# Patient Record
Sex: MALE | URBAN - METROPOLITAN AREA
[De-identification: names, ages, dates, MRNs, and addresses within clinical notes are randomized per-mention and may not be internally consistent; named-entity substitution may affect disease eponyms.]

---

## 2017-02-17 ENCOUNTER — APPOINTMENT (OUTPATIENT)
Age: 72
Setting detail: DERMATOLOGY
End: 2017-02-20

## 2017-02-17 DIAGNOSIS — K6811 OTHER POSTOPERATIVE INFECTION: ICD-10-CM

## 2017-02-17 DIAGNOSIS — L82.1 OTHER SEBORRHEIC KERATOSIS: ICD-10-CM

## 2017-02-17 DIAGNOSIS — D18.0 HEMANGIOMA: ICD-10-CM

## 2017-02-17 DIAGNOSIS — T814XXA OTHER POSTOPERATIVE INFECTION: ICD-10-CM

## 2017-02-17 DIAGNOSIS — Z85.828 PERSONAL HISTORY OF OTHER MALIGNANT NEOPLASM OF SKIN: ICD-10-CM

## 2017-02-17 DIAGNOSIS — D22 MELANOCYTIC NEVI: ICD-10-CM

## 2017-02-17 PROBLEM — D22.5 MELANOCYTIC NEVI OF TRUNK: Status: ACTIVE | Noted: 2017-02-17

## 2017-02-17 PROBLEM — T81.4XXA INFECTION FOLLOWING A PROCEDURE, INITIAL ENCOUNTER: Status: ACTIVE | Noted: 2017-02-17

## 2017-02-17 PROBLEM — D48.5 NEOPLASM OF UNCERTAIN BEHAVIOR OF SKIN: Status: ACTIVE | Noted: 2017-02-17

## 2017-02-17 PROBLEM — D18.01 HEMANGIOMA OF SKIN AND SUBCUTANEOUS TISSUE: Status: ACTIVE | Noted: 2017-02-17

## 2017-02-17 PROCEDURE — 11100: CPT

## 2017-02-17 PROCEDURE — OTHER ORDER TESTS: OTHER

## 2017-02-17 PROCEDURE — OTHER BIOPSY BY SHAVE METHOD: OTHER

## 2017-02-17 PROCEDURE — OTHER COUNSELING: OTHER

## 2017-02-17 PROCEDURE — 99213 OFFICE O/P EST LOW 20 MIN: CPT | Mod: 25

## 2017-02-17 ASSESSMENT — LOCATION ZONE DERM
LOCATION ZONE: FACE
LOCATION ZONE: TRUNK
LOCATION ZONE: ARM
LOCATION ZONE: FINGER

## 2017-02-17 ASSESSMENT — LOCATION DETAILED DESCRIPTION DERM
LOCATION DETAILED: RIGHT SUPERIOR MEDIAL MIDBACK
LOCATION DETAILED: LEFT LATERAL MANDIBULAR CHEEK
LOCATION DETAILED: RIGHT MEDIAL UPPER BACK
LOCATION DETAILED: LEFT PROXIMAL POSTERIOR UPPER ARM
LOCATION DETAILED: RIGHT MID DORSAL RING FINGER
LOCATION DETAILED: LEFT MID-UPPER BACK
LOCATION DETAILED: RIGHT POSTERIOR SHOULDER
LOCATION DETAILED: RIGHT SUPERIOR MEDIAL UPPER BACK
LOCATION DETAILED: EPIGASTRIC SKIN

## 2017-02-17 ASSESSMENT — LOCATION SIMPLE DESCRIPTION DERM
LOCATION SIMPLE: RIGHT RING FINGER
LOCATION SIMPLE: LEFT CHEEK
LOCATION SIMPLE: LEFT UPPER BACK
LOCATION SIMPLE: RIGHT SHOULDER
LOCATION SIMPLE: RIGHT LOWER BACK
LOCATION SIMPLE: ABDOMEN
LOCATION SIMPLE: RIGHT UPPER BACK
LOCATION SIMPLE: LEFT UPPER ARM

## 2017-02-17 NOTE — HPI: SKIN LESIONS
How Severe Is Your Skin Lesion?: mild
Have Your Skin Lesions Been Treated?: not been treated
Is This A New Presentation, Or A Follow-Up?: Skin Lesions
Additional History: His wife noticed these lesions. He did not even know he had them. No pain, bleeding or irritation. History of basal cell carcinoma on left jaw and back several years ago.

## 2017-02-17 NOTE — PROCEDURE: BIOPSY BY SHAVE METHOD
Anesthesia Volume In Cc (Will Not Render If 0): 0.5
Size Of Lesion In Cm: 0
Electrodesiccation Text: The wound bed was treated with electrodesiccation after the biopsy was performed.
Body Location Override (Optional - Billing Will Still Be Based On Selected Body Map Location If Applicable): Left mandibular angle
Electrodesiccation And Curettage Text: The wound bed was treated with electrodesiccation and curettage after the biopsy was performed.
Silver Nitrate Text: The wound bed was treated with silver nitrate after the biopsy was performed.
Wound Care: Vaseline
Billing Type: Third-Party Bill
Bill For Surgical Tray: no
Anesthesia Type: 1% lidocaine with epinephrine and a 1:10 solution of 8.4% sodium bicarbonate
Type Of Destruction Used: Curettage
Consent: Written consent was obtained and risks were reviewed including but not limited to scarring, infection, bleeding, scabbing, incomplete removal, nerve damage and allergy to anesthesia.
Biopsy Type: H and E
Post-Care Instructions: I reviewed with the patient in detail post-care instructions. Patient is to keep the biopsy site dry overnight, and then apply bacitracin twice daily until healed. Patient may apply hydrogen peroxide soaks to remove any crusting.
Cryotherapy Text: The wound bed was treated with cryotherapy after the biopsy was performed.
Curettage Text: The wound bed was treated with curettage after the biopsy was performed.
Notification Instructions: Patient will be notified of biopsy results. However, patient instructed to call the office if not contacted within 2 weeks.
Biopsy Method: Double edge Personna blades
Detail Level: Detailed
Dressing: bandage
Hemostasis: Aluminum Chloride

## 2019-08-28 ENCOUNTER — APPOINTMENT (OUTPATIENT)
Age: 74
Setting detail: DERMATOLOGY
End: 2019-08-29

## 2019-08-28 DIAGNOSIS — L82.1 OTHER SEBORRHEIC KERATOSIS: ICD-10-CM

## 2019-08-28 DIAGNOSIS — D485 NEOPLASM OF UNCERTAIN BEHAVIOR OF SKIN: ICD-10-CM

## 2019-08-28 DIAGNOSIS — L40.0 PSORIASIS VULGARIS: ICD-10-CM

## 2019-08-28 DIAGNOSIS — Z71.89 OTHER SPECIFIED COUNSELING: ICD-10-CM

## 2019-08-28 DIAGNOSIS — L85.3 XEROSIS CUTIS: ICD-10-CM

## 2019-08-28 PROBLEM — D48.5 NEOPLASM OF UNCERTAIN BEHAVIOR OF SKIN: Status: ACTIVE | Noted: 2019-08-28

## 2019-08-28 PROCEDURE — OTHER BIOPSY BY SHAVE METHOD: OTHER

## 2019-08-28 PROCEDURE — OTHER PRESCRIPTION: OTHER

## 2019-08-28 PROCEDURE — OTHER MIPS QUALITY: OTHER

## 2019-08-28 PROCEDURE — OTHER COUNSELING: OTHER

## 2019-08-28 PROCEDURE — 11102 TANGNTL BX SKIN SINGLE LES: CPT

## 2019-08-28 PROCEDURE — 99213 OFFICE O/P EST LOW 20 MIN: CPT | Mod: 25

## 2019-08-28 RX ORDER — TRIAMCINOLONE ACETONIDE 1 MG/G
APPLY THIN COAT CREAM TOPICAL BID
Qty: 30 | Refills: 1 | Status: ERX | COMMUNITY
Start: 2019-08-28

## 2019-08-28 ASSESSMENT — LOCATION SIMPLE DESCRIPTION DERM
LOCATION SIMPLE: RIGHT UPPER BACK
LOCATION SIMPLE: RIGHT KNEE
LOCATION SIMPLE: LEFT CHEEK

## 2019-08-28 ASSESSMENT — BSA PSORIASIS: % BODY COVERED IN PSORIASIS: 1

## 2019-08-28 ASSESSMENT — LOCATION ZONE DERM
LOCATION ZONE: FACE
LOCATION ZONE: LEG
LOCATION ZONE: TRUNK

## 2019-08-28 ASSESSMENT — LOCATION DETAILED DESCRIPTION DERM
LOCATION DETAILED: RIGHT MEDIAL UPPER BACK
LOCATION DETAILED: LEFT LATERAL MANDIBULAR CHEEK
LOCATION DETAILED: RIGHT KNEE

## 2019-08-28 ASSESSMENT — PGA PSORIASIS: PGA PSORIASIS: MARKED (MARKED PLAQUE ELEVATION, BRIGHT ERYTHEMA, THICK NONTENACIOUS SCALE PREDOMINATES)

## 2019-08-28 NOTE — PROCEDURE: BIOPSY BY SHAVE METHOD
Bill 94776 For Specimen Handling/Conveyance To Laboratory?: no
Electrodesiccation Text: The wound bed was treated with electrodesiccation after the biopsy was performed.
Anesthesia Volume In Cc (Will Not Render If 0): 0.3
Electrodesiccation And Curettage Text: The wound bed was treated with electrodesiccation and curettage after the biopsy was performed.
Size Of Lesion In Cm: 1
Consent: Verbal consent was obtained and risks were reviewed including but not limited to scarring, infection, bleeding, scabbing, incomplete removal, nerve damage and allergy to anesthesia.
Notification Instructions: Patient will be notified of biopsy results. However, patient instructed to call the office if not contacted within 2 weeks.
Detail Level: Detailed
Dressing: Band-Aid
X Size Of Lesion In Cm: 0.6
Cryotherapy Text: The wound bed was treated with cryotherapy after the biopsy was performed.
Biopsy Method: double edge Personna blade
Was A Bandage Applied: Yes
Type Of Destruction Used: Electrodesiccation
Depth Of Biopsy: dermis
Silver Nitrate Text: The wound bed was treated with silver nitrate after the biopsy was performed.
Post-Care Instructions: I verbally reviewed with the patient in detail post-care instructions. Patient is to keep the biopsy site dry overnight, and then apply H2O2, Vaseline and a bandage daily until healed.
Body Location Override (Optional - Billing Will Still Be Based On Selected Body Map Location If Applicable): Left jaw angle
Billing Type: Third-Party Bill
Path Notes (To The Dermatopathologist): Please check margins
Biopsy Type: H and E
Additional Anesthesia Volume In Cc (Will Not Render If 0): 0
Curettage Text: The wound bed was treated with curettage after the biopsy was performed.
Hemostasis: Aluminum Chloride and Electrocautery
Wound Care: Vaseline
Anesthesia Type: 1% Xylocaine with epinephrine/sodium bicarbonate and normal saline in a 1:1 ratio

## 2019-08-28 NOTE — PROCEDURE: MIPS QUALITY
Detail Level: Detailed
Quality 474: Zoster Vaccination Status: Shingrix Vaccination not Administered or Previously Received, Reason not Otherwise Specified
Quality 131: Pain Assessment And Follow-Up: Pain assessment using a standardized tool is documented as negative, no follow-up plan required
Quality 110: Preventive Care And Screening: Influenza Immunization: Influenza Immunization previously received during influenza season
Quality 431: Preventive Care And Screening: Unhealthy Alcohol Use - Screening: Patient screened for unhealthy alcohol use using a single question and scores less than 2 times per year
Quality 130: Documentation Of Current Medications In The Medical Record: Current Medications Documented
no fever and no chills.
Quality 265: Biopsy Follow-Up: Biopsy results reviewed, communicated, tracked, and documented
Quality 226: Preventive Care And Screening: Tobacco Use: Screening And Cessation Intervention: Patient screened for tobacco and never smoked

## 2023-05-30 ENCOUNTER — APPOINTMENT (OUTPATIENT)
Dept: URBAN - METROPOLITAN AREA CLINIC 255 | Age: 78
Setting detail: DERMATOLOGY
End: 2023-06-03

## 2023-05-30 VITALS — HEIGHT: 72 IN | WEIGHT: 203 LBS

## 2023-05-30 DIAGNOSIS — D18.0 HEMANGIOMA: ICD-10-CM

## 2023-05-30 DIAGNOSIS — L81.4 OTHER MELANIN HYPERPIGMENTATION: ICD-10-CM

## 2023-05-30 DIAGNOSIS — L82.1 OTHER SEBORRHEIC KERATOSIS: ICD-10-CM

## 2023-05-30 DIAGNOSIS — D22 MELANOCYTIC NEVI: ICD-10-CM

## 2023-05-30 DIAGNOSIS — Z71.89 OTHER SPECIFIED COUNSELING: ICD-10-CM

## 2023-05-30 DIAGNOSIS — D485 NEOPLASM OF UNCERTAIN BEHAVIOR OF SKIN: ICD-10-CM

## 2023-05-30 PROBLEM — D22.5 MELANOCYTIC NEVI OF TRUNK: Status: ACTIVE | Noted: 2023-05-30

## 2023-05-30 PROBLEM — D48.5 NEOPLASM OF UNCERTAIN BEHAVIOR OF SKIN: Status: ACTIVE | Noted: 2023-05-30

## 2023-05-30 PROBLEM — D18.01 HEMANGIOMA OF SKIN AND SUBCUTANEOUS TISSUE: Status: ACTIVE | Noted: 2023-05-30

## 2023-05-30 PROCEDURE — 69100 BIOPSY OF EXTERNAL EAR: CPT

## 2023-05-30 PROCEDURE — 69100 BIOPSY OF EXTERNAL EAR: CPT | Mod: 76

## 2023-05-30 PROCEDURE — OTHER COUNSELING: OTHER

## 2023-05-30 PROCEDURE — OTHER PHOTO-DOCUMENTATION: OTHER

## 2023-05-30 PROCEDURE — 99204 OFFICE O/P NEW MOD 45 MIN: CPT | Mod: 25

## 2023-05-30 PROCEDURE — OTHER MIPS QUALITY: OTHER

## 2023-05-30 PROCEDURE — 11102 TANGNTL BX SKIN SINGLE LES: CPT | Mod: 59

## 2023-05-30 PROCEDURE — OTHER BIOPSY BY SHAVE METHOD: OTHER

## 2023-05-30 ASSESSMENT — LOCATION SIMPLE DESCRIPTION DERM
LOCATION SIMPLE: RIGHT CHEEK
LOCATION SIMPLE: RIGHT EAR
LOCATION SIMPLE: UPPER BACK

## 2023-05-30 ASSESSMENT — LOCATION DETAILED DESCRIPTION DERM
LOCATION DETAILED: RIGHT MEDIAL MALAR CHEEK
LOCATION DETAILED: RIGHT SUPERIOR HELIX
LOCATION DETAILED: INFERIOR THORACIC SPINE
LOCATION DETAILED: RIGHT INFERIOR HELIX

## 2023-05-30 ASSESSMENT — LOCATION ZONE DERM
LOCATION ZONE: TRUNK
LOCATION ZONE: EAR
LOCATION ZONE: FACE

## 2023-05-30 NOTE — PROCEDURE: BIOPSY BY SHAVE METHOD
Hide Additional Anticipated Plan?: No
Was A Bandage Applied: Yes
Anesthesia Type: 1% lidocaine with epinephrine and a 1:10 solution of 8.4% sodium bicarbonate
Biopsy Method: Dermablade
Silver Nitrate Text: The wound bed was treated with silver nitrate after the biopsy was performed.
Information: Selecting Yes will display possible errors in your note based on the variables you have selected. This validation is only offered as a suggestion for you. PLEASE NOTE THAT THE VALIDATION TEXT WILL BE REMOVED WHEN YOU FINALIZE YOUR NOTE. IF YOU WANT TO FAX A PRELIMINARY NOTE YOU WILL NEED TO TOGGLE THIS TO 'NO' IF YOU DO NOT WANT IT IN YOUR FAXED NOTE.
Size Of Lesion In Cm: 0
Detail Level: Detailed
Billing Type: Third-Party Bill
Anesthesia Volume In Cc (Will Not Render If 0): 0.3
Curettage Text: The wound bed was treated with curettage after the biopsy was performed.
Wound Care: Petrolatum
Dressing: bandage
Cryotherapy Text: The wound bed was treated with cryotherapy after the biopsy was performed.
Depth Of Biopsy: dermis
Electrodesiccation And Curettage Text: The wound bed was treated with electrodesiccation and curettage after the biopsy was performed.
Notification Instructions: Patient will be notified of biopsy results. However, patient instructed to call the office if not contacted within 2 weeks.
Consent: Written consent was obtained and risks were reviewed including but not limited to scarring, infection, bleeding, scabbing, incomplete removal, nerve damage and allergy to anesthesia.
Hemostasis: Drysol
Path Notes (To The Dermatopathologist): Please check margins
Post-Care Instructions: I reviewed with the patient in detail post-care instructions. Patient is to keep the biopsy site dry overnight, and then apply bacitracin twice daily until healed. Patient may apply hydrogen peroxide soaks to remove any crusting.
Electrodesiccation Text: The wound bed was treated with electrodesiccation after the biopsy was performed.
Type Of Destruction Used: Curettage
Biopsy Type: H and E

## 2023-06-12 ENCOUNTER — APPOINTMENT (OUTPATIENT)
Dept: URBAN - METROPOLITAN AREA CLINIC 255 | Age: 78
Setting detail: DERMATOLOGY
End: 2023-06-18

## 2023-06-12 VITALS — WEIGHT: 202 LBS | HEIGHT: 71 IN

## 2023-06-12 DIAGNOSIS — Z48.817 ENCOUNTER FOR SURGICAL AFTERCARE FOLLOWING SURGERY ON THE SKIN AND SUBCUTANEOUS TISSUE: ICD-10-CM

## 2023-06-12 DIAGNOSIS — L82.0 INFLAMED SEBORRHEIC KERATOSIS: ICD-10-CM

## 2023-06-12 PROCEDURE — 99212 OFFICE O/P EST SF 10 MIN: CPT | Mod: 25

## 2023-06-12 PROCEDURE — OTHER MIPS QUALITY: OTHER

## 2023-06-12 PROCEDURE — OTHER ADDITIONAL NOTES: OTHER

## 2023-06-12 PROCEDURE — 17110 DESTRUCT B9 LESION 1-14: CPT

## 2023-06-12 PROCEDURE — OTHER COUNSELING: OTHER

## 2023-06-12 PROCEDURE — OTHER LIQUID NITROGEN: OTHER

## 2023-06-12 ASSESSMENT — LOCATION ZONE DERM
LOCATION ZONE: EAR
LOCATION ZONE: FACE
LOCATION ZONE: TRUNK

## 2023-06-12 ASSESSMENT — LOCATION SIMPLE DESCRIPTION DERM
LOCATION SIMPLE: LEFT TEMPLE
LOCATION SIMPLE: RIGHT EAR
LOCATION SIMPLE: CHEST

## 2023-06-12 ASSESSMENT — LOCATION DETAILED DESCRIPTION DERM
LOCATION DETAILED: RIGHT SUPERIOR HELIX
LOCATION DETAILED: RIGHT MEDIAL SUPERIOR CHEST
LOCATION DETAILED: LEFT CENTRAL TEMPLE

## 2023-06-12 NOTE — HPI: SKIN LESION (IRRITATED SEBORRHEIC KERATOSES)
How Severe Are They?: moderate
Is This A New Presentation, Or A Follow-Up?: Follow Up Irritated Seborrheic Keratoses
Additional History: Patient presents today for re-treatment of ISKS. Also reports that biopsy site on ear is bleeding frequently.

## 2023-06-12 NOTE — PROCEDURE: LIQUID NITROGEN
Show Topical Anesthesia Variable?: Yes
Consent: The patient's consent was obtained including but not limited to risks of crusting, scabbing, blistering, scarring, darker or lighter pigmentary change, recurrence, incomplete removal and infection.
Spray Paint Technique: No
Medical Necessity Clause: This procedure was medically necessary because the lesions that were treated were:
Number Of Freeze-Thaw Cycles: 2 freeze-thaw cycles
Medical Necessity Information: It is in your best interest to select a reason for this procedure from the list below. All of these items fulfill various CMS LCD requirements except the new and changing color options.
Detail Level: Detailed
Post-Care Instructions: I reviewed with the patient in detail post-care instructions. Patient is to wear sunprotection, and avoid picking at any of the treated lesions. Pt may apply Vaseline to crusted or scabbing areas.
Spray Paint Text: The liquid nitrogen was applied to the skin utilizing a spray paint frosting technique.
Duration Of Freeze Thaw-Cycle (Seconds): 5-10

## 2023-06-12 NOTE — PROCEDURE: MIPS QUALITY
Quality 111:Pneumonia Vaccination Status For Older Adults: Patient received any pneumococcal conjugate or polysaccharide vaccine on or after their 60th birthday and before the end of the measurement period
Detail Level: Detailed
Quality 431: Preventive Care And Screening: Unhealthy Alcohol Use - Screening: Patient not identified as an unhealthy alcohol user when screened for unhealthy alcohol use using a systematic screening method
Quality 130: Documentation Of Current Medications In The Medical Record: Current Medications Documented
Quality 110: Preventive Care And Screening: Influenza Immunization: Influenza Immunization Administered during Influenza season
Quality 226: Preventive Care And Screening: Tobacco Use: Screening And Cessation Intervention: Patient screened for tobacco use and is an ex/non-smoker

## 2023-06-12 NOTE — PROCEDURE: ADDITIONAL NOTES
Additional Notes: Patient given biopsy results and evaluated biopsy site. It is well healing
Render Risk Assessment In Note?: no
Detail Level: Simple

## 2023-07-13 ENCOUNTER — APPOINTMENT (OUTPATIENT)
Dept: URBAN - METROPOLITAN AREA CLINIC 255 | Age: 78
Setting detail: DERMATOLOGY
End: 2023-07-20

## 2023-07-13 DIAGNOSIS — D49.2 NEOPLASM OF UNSPECIFIED BEHAVIOR OF BONE, SOFT TISSUE, AND SKIN: ICD-10-CM

## 2023-07-13 PROBLEM — D04.21 CARCINOMA IN SITU OF SKIN OF RIGHT EAR AND EXTERNAL AURICULAR CANAL: Status: ACTIVE | Noted: 2023-07-13

## 2023-07-13 PROBLEM — J30.1 ALLERGIC RHINITIS DUE TO POLLEN: Status: ACTIVE | Noted: 2023-07-13

## 2023-07-13 PROBLEM — C44.01 BASAL CELL CARCINOMA OF SKIN OF LIP: Status: ACTIVE | Noted: 2023-07-13

## 2023-07-13 PROCEDURE — 17311 MOHS 1 STAGE H/N/HF/G: CPT

## 2023-07-13 PROCEDURE — 69100 BIOPSY OF EXTERNAL EAR: CPT

## 2023-07-13 PROCEDURE — OTHER BIOPSY BY SHAVE METHOD: OTHER

## 2023-07-13 PROCEDURE — OTHER COUNSELING: OTHER

## 2023-07-13 PROCEDURE — 14060 TIS TRNFR E/N/E/L 10 SQ CM/<: CPT

## 2023-07-13 PROCEDURE — OTHER MOHS SURGERY: OTHER

## 2023-07-13 PROCEDURE — 13152 CMPLX RPR E/N/E/L 2.6-7.5 CM: CPT | Mod: 59

## 2023-07-13 PROCEDURE — OTHER MIPS QUALITY: OTHER

## 2023-07-13 PROCEDURE — 17311 MOHS 1 STAGE H/N/HF/G: CPT | Mod: 76

## 2023-07-13 ASSESSMENT — LOCATION DETAILED DESCRIPTION DERM: LOCATION DETAILED: LEFT SUPERIOR POSTERIOR HELIX

## 2023-07-13 ASSESSMENT — LOCATION ZONE DERM: LOCATION ZONE: EAR

## 2023-07-13 ASSESSMENT — LOCATION SIMPLE DESCRIPTION DERM: LOCATION SIMPLE: LEFT EAR

## 2023-07-13 NOTE — PROCEDURE: BIOPSY BY SHAVE METHOD
Body Location Override (Optional - Billing Will Still Be Based On Selected Body Map Location If Applicable): Left Superior Helical Rim

## 2023-07-13 NOTE — PROCEDURE: MIPS QUALITY
Quality 111:Pneumonia Vaccination Status For Older Adults: Patient received any pneumococcal conjugate or polysaccharide vaccine on or after their 60th birthday and before the end of the measurement period
Quality 226: Preventive Care And Screening: Tobacco Use: Screening And Cessation Intervention: Patient screened for tobacco use and is an ex/non-smoker
Quality 130: Documentation Of Current Medications In The Medical Record: Current Medications Documented
Quality 143: Oncology: Medical And Radiation- Pain Intensity Quantified: Pain severity quantified, no pain present
Quality 431: Preventive Care And Screening: Unhealthy Alcohol Use - Screening: Patient not identified as an unhealthy alcohol user when screened for unhealthy alcohol use using a systematic screening method
Detail Level: Detailed
Quality 110: Preventive Care And Screening: Influenza Immunization: Influenza Immunization previously received during influenza season

## 2023-07-13 NOTE — PROCEDURE: MOHS SURGERY
NO DIABETIC RETINOPATHY NOTED ON TODAYS EXAM: RETURN FOR FOLLOW-UP AS SCHEDULED FOR DILATED EXAM AND OCT FOR ANY PROGRESSION. V-Y Flap Text: The defect edges were debeveled with a #15 scalpel blade. Given the location of the defect, shape of the defect and the proximity to free margins a V-Y flap was deemed most appropriate. Using a sterile surgical marker, an appropriate advancement flap was drawn incorporating the defect and placing the expected incisions within the relaxed skin tension lines where possible. The area thus outlined was incised deep to adipose tissue with a #15 scalpel blade. The skin margins were undermined to an appropriate distance in all directions utilizing iris scissors. Following this, the designed flap was advanced and carried over into the primary defect and sutured into place.

## 2023-07-13 NOTE — PROCEDURE: MOHS SURGERY
Body Location Override (Optional - Billing Will Still Be Based On Selected Body Map Location If Applicable): Right Medial Cheek @ [facility]

## 2023-07-13 NOTE — PROCEDURE: MOHS SURGERY
No No no Banner Transposition Flap Text: The defect edges were debeveled with a #15 scalpel blade. Given the location of the defect and the proximity to free margins a Banner transposition flap was deemed most appropriate. Using a sterile surgical marker, an appropriate flap was drawn around the defect. The area thus outlined was incised deep to adipose tissue with a #15 scalpel blade. The skin margins were undermined to an appropriate distance in all directions utilizing iris scissors. Following this, the designed flap was carried into the primary defect and sutured into place.

## 2023-07-13 NOTE — PROCEDURE: MOHS SURGERY
Patient with history of opioid dependence on oxycodone twice daily 5mg bid  We will continue on oxycodone 5 mg twice daily scheduled Debridement Text: The wound edges were debrided prior to proceeding with the closure to facilitate wound healing.

## 2023-07-13 NOTE — PROCEDURE: MOHS SURGERY
Body Location Override (Optional - Billing Will Still Be Based On Selected Body Map Location If Applicable): Right Superior Helical Rim

## 2023-07-20 ENCOUNTER — APPOINTMENT (OUTPATIENT)
Dept: URBAN - METROPOLITAN AREA CLINIC 255 | Age: 78
Setting detail: DERMATOLOGY
End: 2023-08-07

## 2023-07-20 DIAGNOSIS — Z48.02 ENCOUNTER FOR REMOVAL OF SUTURES: ICD-10-CM

## 2023-07-20 PROCEDURE — OTHER MIPS QUALITY: OTHER

## 2023-07-20 PROCEDURE — OTHER RETURN TO REFERRING PROVIDER: OTHER

## 2023-07-20 PROCEDURE — OTHER DIAGNOSIS COMMENT: OTHER

## 2023-07-20 PROCEDURE — 99024 POSTOP FOLLOW-UP VISIT: CPT

## 2023-07-20 PROCEDURE — OTHER COUNSELING: OTHER

## 2023-07-20 PROCEDURE — OTHER SUTURE REMOVAL (GLOBAL PERIOD): OTHER

## 2023-07-20 ASSESSMENT — LOCATION SIMPLE DESCRIPTION DERM
LOCATION SIMPLE: RIGHT CHEEK
LOCATION SIMPLE: RIGHT EAR

## 2023-07-20 ASSESSMENT — LOCATION ZONE DERM
LOCATION ZONE: EAR
LOCATION ZONE: FACE

## 2023-07-20 ASSESSMENT — LOCATION DETAILED DESCRIPTION DERM
LOCATION DETAILED: RIGHT INFERIOR MEDIAL MALAR CHEEK
LOCATION DETAILED: RIGHT SUPERIOR HELIX

## 2023-07-20 NOTE — PROCEDURE: DIAGNOSIS COMMENT
Comment: S/P Mohs Surgery (Repair: Advancement Flap (Single)) for nodular basal cell carcinoma on (July 13, 2023)
Render Risk Assessment In Note?: no
Detail Level: Simple
Comment: S/P Mohs Surgery (Repair: Complex Repair) for squamous cell carcinoma in situ on (July 13, 2023)

## 2023-07-20 NOTE — PROCEDURE: SUTURE REMOVAL (GLOBAL PERIOD)
Add 53345 Cpt? (Important Note: In 2017 The Use Of 37050 Is Being Tracked By Cms To Determine Future Global Period Reimbursement For Global Periods): no
Body Location Override (Optional - Billing Will Still Be Based On Selected Body Map Location If Applicable): Right Medial Cheek @ [facility]
Detail Level: Detailed
Body Location Override (Optional - Billing Will Still Be Based On Selected Body Map Location If Applicable): Right Superior Helical Rim

## 2023-08-16 NOTE — PROCEDURE: MOHS SURGERY
DISPLAY PLAN FREE TEXT Ear Star Wedge Flap Text: The defect edges were debeveled with a #15 blade scalpel.  Given the location of the defect and the proximity to free margins (helical rim) an ear star wedge flap was deemed most appropriate. Using a sterile surgical marker, the appropriate flap was drawn incorporating the defect and placing the expected incisions between the helical rim and antihelix where possible.  The area thus outlined was incised through and through with a #15 scalpel blade. Following this, the designed flap was carried over into the primary defect and sutured into place.

## 2024-01-10 ENCOUNTER — APPOINTMENT (OUTPATIENT)
Dept: URBAN - METROPOLITAN AREA CLINIC 255 | Age: 79
Setting detail: DERMATOLOGY
End: 2024-01-11

## 2024-01-10 DIAGNOSIS — D49.2 NEOPLASM OF UNSPECIFIED BEHAVIOR OF BONE, SOFT TISSUE, AND SKIN: ICD-10-CM

## 2024-01-10 DIAGNOSIS — L57.0 ACTINIC KERATOSIS: ICD-10-CM

## 2024-01-10 DIAGNOSIS — L57.8 OTHER SKIN CHANGES DUE TO CHRONIC EXPOSURE TO NONIONIZING RADIATION: ICD-10-CM

## 2024-01-10 DIAGNOSIS — L82.1 OTHER SEBORRHEIC KERATOSIS: ICD-10-CM

## 2024-01-10 DIAGNOSIS — Z85.828 PERSONAL HISTORY OF OTHER MALIGNANT NEOPLASM OF SKIN: ICD-10-CM

## 2024-01-10 DIAGNOSIS — Z71.89 OTHER SPECIFIED COUNSELING: ICD-10-CM

## 2024-01-10 DIAGNOSIS — D18.0 HEMANGIOMA: ICD-10-CM

## 2024-01-10 DIAGNOSIS — L82.0 INFLAMED SEBORRHEIC KERATOSIS: ICD-10-CM

## 2024-01-10 DIAGNOSIS — Z86.007 PERSONAL HISTORY OF IN-SITU NEOPLASM OF SKIN: ICD-10-CM

## 2024-01-10 DIAGNOSIS — D22 MELANOCYTIC NEVI: ICD-10-CM

## 2024-01-10 DIAGNOSIS — L81.4 OTHER MELANIN HYPERPIGMENTATION: ICD-10-CM

## 2024-01-10 PROBLEM — D18.01 HEMANGIOMA OF SKIN AND SUBCUTANEOUS TISSUE: Status: ACTIVE | Noted: 2024-01-10

## 2024-01-10 PROBLEM — D22.5 MELANOCYTIC NEVI OF TRUNK: Status: ACTIVE | Noted: 2024-01-10

## 2024-01-10 PROCEDURE — OTHER COUNSELING: OTHER

## 2024-01-10 PROCEDURE — 17004 DESTROY PREMAL LESIONS 15/>: CPT

## 2024-01-10 PROCEDURE — OTHER MIPS QUALITY: OTHER

## 2024-01-10 PROCEDURE — 17110 DESTRUCT B9 LESION 1-14: CPT | Mod: 59

## 2024-01-10 PROCEDURE — OTHER BIOPSY BY SHAVE METHOD: OTHER

## 2024-01-10 PROCEDURE — 11103 TANGNTL BX SKIN EA SEP/ADDL: CPT | Mod: 59

## 2024-01-10 PROCEDURE — 99213 OFFICE O/P EST LOW 20 MIN: CPT | Mod: 25

## 2024-01-10 PROCEDURE — OTHER LIQUID NITROGEN: OTHER

## 2024-01-10 PROCEDURE — 11102 TANGNTL BX SKIN SINGLE LES: CPT | Mod: 59

## 2024-01-10 ASSESSMENT — LOCATION SIMPLE DESCRIPTION DERM
LOCATION SIMPLE: SCALP
LOCATION SIMPLE: LEFT EAR
LOCATION SIMPLE: RIGHT EYEBROW
LOCATION SIMPLE: LEFT FOREARM
LOCATION SIMPLE: POSTERIOR SCALP
LOCATION SIMPLE: CHEST
LOCATION SIMPLE: ABDOMEN
LOCATION SIMPLE: RIGHT FOREHEAD
LOCATION SIMPLE: RIGHT CHEEK
LOCATION SIMPLE: LEFT UPPER BACK
LOCATION SIMPLE: LEFT SCALP
LOCATION SIMPLE: LEFT ANKLE
LOCATION SIMPLE: RIGHT EAR

## 2024-01-10 ASSESSMENT — LOCATION ZONE DERM
LOCATION ZONE: EAR
LOCATION ZONE: ARM
LOCATION ZONE: TRUNK
LOCATION ZONE: LEG
LOCATION ZONE: SCALP
LOCATION ZONE: FACE

## 2024-01-10 ASSESSMENT — LOCATION DETAILED DESCRIPTION DERM
LOCATION DETAILED: LEFT POSTERIOR PARIETAL SCALP
LOCATION DETAILED: RIGHT LATERAL MALAR CHEEK
LOCATION DETAILED: LEFT POSTERIOR ANKLE
LOCATION DETAILED: LEFT SUPERIOR MEDIAL UPPER BACK
LOCATION DETAILED: LEFT SUPERIOR PARIETAL SCALP
LOCATION DETAILED: LEFT MEDIAL UPPER BACK
LOCATION DETAILED: LEFT CENTRAL PARIETAL SCALP
LOCATION DETAILED: LEFT ANTITRAGUS
LOCATION DETAILED: PERIUMBILICAL SKIN
LOCATION DETAILED: LEFT TRIANGULAR FOSSA
LOCATION DETAILED: LEFT PROXIMAL DORSAL FOREARM
LOCATION DETAILED: RIGHT MEDIAL MALAR CHEEK
LOCATION DETAILED: LEFT SUPERIOR OCCIPITAL SCALP
LOCATION DETAILED: RIGHT INFERIOR FOREHEAD
LOCATION DETAILED: RIGHT SUPERIOR HELIX
LOCATION DETAILED: RIGHT SUPERIOR FOREHEAD
LOCATION DETAILED: RIGHT CENTRAL EYEBROW
LOCATION DETAILED: LEFT INFERIOR FRONTAL SCALP
LOCATION DETAILED: STERNUM
LOCATION DETAILED: LEFT PROXIMAL RADIAL DORSAL FOREARM
LOCATION DETAILED: RIGHT SUPERIOR PARIETAL SCALP
LOCATION DETAILED: LEFT CENTRAL FRONTAL SCALP
LOCATION DETAILED: LEFT DISTAL DORSAL FOREARM
LOCATION DETAILED: POSTERIOR MID-PARIETAL SCALP

## 2024-01-10 NOTE — PROCEDURE: BIOPSY BY SHAVE METHOD
Detail Level: Detailed
Depth Of Biopsy: dermis
Was A Bandage Applied: Yes
Size Of Lesion In Cm: 1
Biopsy Type: H and E
Biopsy Method: double edge Personna blade
Anesthesia Type: 1% lidocaine with epinephrine and a 1:10 solution of 8.4% sodium bicarbonate
Anesthesia Volume In Cc: 0.5
Additional Anesthesia Volume In Cc (Will Not Render If 0): 0
Hemostasis: Drysol
Wound Care: Petrolatum
Dressing: bandage
Destruction After The Procedure: No
Type Of Destruction Used: Curettage
Curettage Text: The wound bed was treated with curettage after the biopsy was performed.
Cryotherapy Text: The wound bed was treated with cryotherapy after the biopsy was performed.
Electrodesiccation Text: The wound bed was treated with electrodesiccation after the biopsy was performed.
Electrodesiccation And Curettage Text: The wound bed was treated with electrodesiccation and curettage after the biopsy was performed.
Silver Nitrate Text: The wound bed was treated with silver nitrate after the biopsy was performed.
Lab: -[id number]
Path Notes Override (Will Replace All Of The Above Text): Please check margins
Consent: Written consent was obtained and risks were reviewed including but not limited to scarring, infection, bleeding, scabbing, incomplete removal, nerve damage and allergy to anesthesia.
Post-Care Instructions: I reviewed with the patient in detail post-care instructions. Patient is to keep the biopsy site dry overnight, and then apply bacitracin twice daily until healed. Patient may apply hydrogen peroxide soaks to remove any crusting.
Notification Instructions: Patient will be notified of biopsy results. However, patient instructed to call the office if not contacted within 2 weeks.
Billing Type: Third-Party Bill
Information: Selecting Yes will display possible errors in your note based on the variables you have selected. This validation is only offered as a suggestion for you. PLEASE NOTE THAT THE VALIDATION TEXT WILL BE REMOVED WHEN YOU FINALIZE YOUR NOTE. IF YOU WANT TO FAX A PRELIMINARY NOTE YOU WILL NEED TO TOGGLE THIS TO 'NO' IF YOU DO NOT WANT IT IN YOUR FAXED NOTE.
Body Location Override (Optional - Billing Will Still Be Based On Selected Body Map Location If Applicable): Left Lateral Calf
Size Of Lesion In Cm: 2
X Size Of Lesion In Cm: 1.5
Biopsy Method: Dermablade
Anesthesia Type: 1% lidocaine with epinephrine

## 2024-01-10 NOTE — PROCEDURE: LIQUID NITROGEN
Consent: - Discussed that these are a result of cumulative sun exposure.\\n- Verbal and written consent was obtained, and risks were reviewed prior to procedure today. \\n- Risks discussed include but are not limited to pain, crusting, scabbing, blistering, scarring, temporary or permanent darker or lighter pigmentary change, recurrence, incomplete resolution, and infection.
Render Post-Care Instructions In Note?: yes
Duration Of Freeze Thaw-Cycle (Seconds): 5
Application Tool (Optional): Cry-AC
Detail Level: Detailed
Number Of Freeze-Thaw Cycles: 1 freeze-thaw cycle
Post-Care Instructions: - Patient was instructed to avoid picking at any of the treated lesions.
Show Aperture Variable?: No
Consent: The patient's consent was obtained including but not limited to risks of crusting, scabbing, blistering, scarring, darker or lighter pigmentary change, recurrence, incomplete removal and infection.
Number Of Freeze-Thaw Cycles: 2 freeze-thaw cycles
Post-Care Instructions: I reviewed with the patient in detail post-care instructions. Patient is to wear sunprotection, and avoid picking at any of the treated lesions. Pt may apply Vaseline to crusted or scabbing areas.
Consent: - Verbal and written consent was obtained, and risks were reviewed prior to procedure today. \\n- Risks discussed include but are not limited to pain, crusting, scabbing, blistering, scarring, temporary or permanent darker or lighter pigmentary change, recurrence, incomplete resolution, and infection.
Application Tool (Optional): Liquid Nitrogen Sprayer
Medical Necessity Clause: This procedure was medically necessary because the lesions that were treated were:
Spray Paint Text: The liquid nitrogen was applied to the skin utilizing a spray paint frosting technique.
Medical Necessity Information: It is in your best interest to select a reason for this procedure from the list below. All of these items fulfill various CMS LCD requirements except the new and changing color options.
Post-Care Instructions: - Avoid picking at any of the treated lesions.\\n- Blisters should not be popped. However should a blister rupture, cover it with Vaseline ointment or Aquaphor and a bandage until healed.
Duration Of Freeze Thaw-Cycle (Seconds): 5-10

## 2024-01-10 NOTE — PROCEDURE: MIPS QUALITY
Quality 47: Advance Care Plan: Advance Care Planning discussed and documented in the medical record; patient did not wish or was not able to name a surrogate decision maker or provide an advance care plan.
Quality 226: Preventive Care And Screening: Tobacco Use: Screening And Cessation Intervention: Patient screened for tobacco use and is an ex/non-smoker
Quality 110: Preventive Care And Screening: Influenza Immunization: Influenza Immunization Ordered or Recommended, but not Administered due to system reason
Quality 130: Documentation Of Current Medications In The Medical Record: Current Medications Documented
Detail Level: Detailed
Quality 431: Preventive Care And Screening: Unhealthy Alcohol Use - Screening: Patient not identified as an unhealthy alcohol user when screened for unhealthy alcohol use using a systematic screening method

## 2024-02-06 ENCOUNTER — APPOINTMENT (OUTPATIENT)
Dept: GENERAL RADIOLOGY | Facility: CLINIC | Age: 79
End: 2024-02-06
Attending: EMERGENCY MEDICINE
Payer: COMMERCIAL

## 2024-02-06 ENCOUNTER — HOSPITAL ENCOUNTER (OUTPATIENT)
Facility: CLINIC | Age: 79
Setting detail: OBSERVATION
Discharge: HOME | End: 2024-02-08
Attending: EMERGENCY MEDICINE | Admitting: INTERNAL MEDICINE
Payer: COMMERCIAL

## 2024-02-06 DIAGNOSIS — I48.91 ATRIAL FIBRILLATION WITH RVR (H): ICD-10-CM

## 2024-02-06 LAB
ANION GAP SERPL CALCULATED.3IONS-SCNC: 8 MMOL/L (ref 7–15)
BASOPHILS # BLD AUTO: 0 10E3/UL (ref 0–0.2)
BASOPHILS NFR BLD AUTO: 1 %
BUN SERPL-MCNC: 19.2 MG/DL (ref 8–23)
CALCIUM SERPL-MCNC: 9.2 MG/DL (ref 8.8–10.2)
CHLORIDE SERPL-SCNC: 100 MMOL/L (ref 98–107)
CREAT SERPL-MCNC: 1.11 MG/DL (ref 0.67–1.17)
DEPRECATED HCO3 PLAS-SCNC: 30 MMOL/L (ref 22–29)
EGFRCR SERPLBLD CKD-EPI 2021: 68 ML/MIN/1.73M2
EOSINOPHIL # BLD AUTO: 0.2 10E3/UL (ref 0–0.7)
EOSINOPHIL NFR BLD AUTO: 3 %
ERYTHROCYTE [DISTWIDTH] IN BLOOD BY AUTOMATED COUNT: 12.9 % (ref 10–15)
GLUCOSE SERPL-MCNC: 112 MG/DL (ref 70–99)
HCT VFR BLD AUTO: 40.4 % (ref 40–53)
HGB BLD-MCNC: 14 G/DL (ref 13.3–17.7)
HOLD SPECIMEN: NORMAL
IMM GRANULOCYTES # BLD: 0 10E3/UL
IMM GRANULOCYTES NFR BLD: 0 %
LYMPHOCYTES # BLD AUTO: 0.8 10E3/UL (ref 0.8–5.3)
LYMPHOCYTES NFR BLD AUTO: 15 %
MCH RBC QN AUTO: 32 PG (ref 26.5–33)
MCHC RBC AUTO-ENTMCNC: 34.7 G/DL (ref 31.5–36.5)
MCV RBC AUTO: 92 FL (ref 78–100)
MONOCYTES # BLD AUTO: 0.9 10E3/UL (ref 0–1.3)
MONOCYTES NFR BLD AUTO: 16 %
NEUTROPHILS # BLD AUTO: 3.6 10E3/UL (ref 1.6–8.3)
NEUTROPHILS NFR BLD AUTO: 65 %
NRBC # BLD AUTO: 0 10E3/UL
NRBC BLD AUTO-RTO: 0 /100
NT-PROBNP SERPL-MCNC: 789 PG/ML (ref 0–1800)
PLATELET # BLD AUTO: 192 10E3/UL (ref 150–450)
POTASSIUM SERPL-SCNC: 3.9 MMOL/L (ref 3.4–5.3)
RBC # BLD AUTO: 4.38 10E6/UL (ref 4.4–5.9)
SODIUM SERPL-SCNC: 138 MMOL/L (ref 135–145)
TROPONIN T SERPL HS-MCNC: 26 NG/L
TROPONIN T SERPL HS-MCNC: 34 NG/L
TSH SERPL DL<=0.005 MIU/L-ACNC: 3.5 UIU/ML (ref 0.3–4.2)
WBC # BLD AUTO: 5.4 10E3/UL (ref 4–11)

## 2024-02-06 PROCEDURE — 36415 COLL VENOUS BLD VENIPUNCTURE: CPT | Performed by: EMERGENCY MEDICINE

## 2024-02-06 PROCEDURE — 85025 COMPLETE CBC W/AUTO DIFF WBC: CPT | Performed by: EMERGENCY MEDICINE

## 2024-02-06 PROCEDURE — 84484 ASSAY OF TROPONIN QUANT: CPT | Performed by: EMERGENCY MEDICINE

## 2024-02-06 PROCEDURE — 83735 ASSAY OF MAGNESIUM: CPT | Performed by: INTERNAL MEDICINE

## 2024-02-06 PROCEDURE — 99223 1ST HOSP IP/OBS HIGH 75: CPT | Performed by: INTERNAL MEDICINE

## 2024-02-06 PROCEDURE — 84443 ASSAY THYROID STIM HORMONE: CPT | Performed by: EMERGENCY MEDICINE

## 2024-02-06 PROCEDURE — 258N000003 HC RX IP 258 OP 636: Performed by: EMERGENCY MEDICINE

## 2024-02-06 PROCEDURE — 96376 TX/PRO/DX INJ SAME DRUG ADON: CPT

## 2024-02-06 PROCEDURE — 250N000011 HC RX IP 250 OP 636

## 2024-02-06 PROCEDURE — 80048 BASIC METABOLIC PNL TOTAL CA: CPT | Performed by: EMERGENCY MEDICINE

## 2024-02-06 PROCEDURE — 96366 THER/PROPH/DIAG IV INF ADDON: CPT

## 2024-02-06 PROCEDURE — 250N000009 HC RX 250: Performed by: EMERGENCY MEDICINE

## 2024-02-06 PROCEDURE — 96365 THER/PROPH/DIAG IV INF INIT: CPT

## 2024-02-06 PROCEDURE — G0378 HOSPITAL OBSERVATION PER HR: HCPCS

## 2024-02-06 PROCEDURE — 83880 ASSAY OF NATRIURETIC PEPTIDE: CPT | Performed by: EMERGENCY MEDICINE

## 2024-02-06 PROCEDURE — 71046 X-RAY EXAM CHEST 2 VIEWS: CPT

## 2024-02-06 PROCEDURE — 96361 HYDRATE IV INFUSION ADD-ON: CPT

## 2024-02-06 PROCEDURE — 99285 EMERGENCY DEPT VISIT HI MDM: CPT | Mod: 25

## 2024-02-06 RX ORDER — ASPIRIN 81 MG/1
81 TABLET ORAL DAILY
COMMUNITY

## 2024-02-06 RX ORDER — CLOPIDOGREL BISULFATE 75 MG/1
75 TABLET ORAL DAILY
Status: ON HOLD | COMMUNITY
Start: 2022-03-29 | End: 2024-02-07

## 2024-02-06 RX ORDER — DILTIAZEM HYDROCHLORIDE 5 MG/ML
0.25 INJECTION INTRAVENOUS ONCE
Status: COMPLETED | OUTPATIENT
Start: 2024-02-06 | End: 2024-02-06

## 2024-02-06 RX ORDER — DILTIAZEM HYDROCHLORIDE 5 MG/ML
INJECTION INTRAVENOUS
Status: COMPLETED
Start: 2024-02-06 | End: 2024-02-06

## 2024-02-06 RX ORDER — CETIRIZINE HYDROCHLORIDE 10 MG/1
TABLET ORAL
Status: ON HOLD | COMMUNITY
Start: 2022-03-03 | End: 2024-02-07

## 2024-02-06 RX ORDER — OMEGA-3-ACID ETHYL ESTERS 1 G/1
2 CAPSULE, LIQUID FILLED ORAL
Status: ON HOLD | COMMUNITY
End: 2024-02-07

## 2024-02-06 RX ORDER — VITAMIN B COMPLEX
1000 TABLET ORAL
Status: ON HOLD | COMMUNITY
End: 2024-02-07

## 2024-02-06 RX ADMIN — SODIUM CHLORIDE 1000 ML: 9 INJECTION, SOLUTION INTRAVENOUS at 20:06

## 2024-02-06 RX ADMIN — SODIUM CHLORIDE 1000 ML: 9 INJECTION, SOLUTION INTRAVENOUS at 22:11

## 2024-02-06 RX ADMIN — DILTIAZEM HYDROCHLORIDE 23.25 MG: 5 INJECTION INTRAVENOUS at 20:16

## 2024-02-06 RX ADMIN — DILTIAZEM HYDROCHLORIDE 5 MG/HR: 5 INJECTION INTRAVENOUS at 22:45

## 2024-02-06 ASSESSMENT — ACTIVITIES OF DAILY LIVING (ADL)
ADLS_ACUITY_SCORE: 35
ADLS_ACUITY_SCORE: 35

## 2024-02-07 LAB
MAGNESIUM SERPL-MCNC: 1.4 MG/DL (ref 1.7–2.3)
MAGNESIUM SERPL-MCNC: 2 MG/DL (ref 1.7–2.3)
TROPONIN T SERPL HS-MCNC: 69 NG/L
TROPONIN T SERPL HS-MCNC: 87 NG/L

## 2024-02-07 PROCEDURE — 36415 COLL VENOUS BLD VENIPUNCTURE: CPT | Performed by: INTERNAL MEDICINE

## 2024-02-07 PROCEDURE — G0378 HOSPITAL OBSERVATION PER HR: HCPCS

## 2024-02-07 PROCEDURE — 250N000013 HC RX MED GY IP 250 OP 250 PS 637: Performed by: INTERNAL MEDICINE

## 2024-02-07 PROCEDURE — 250N000011 HC RX IP 250 OP 636: Performed by: INTERNAL MEDICINE

## 2024-02-07 PROCEDURE — 99223 1ST HOSP IP/OBS HIGH 75: CPT | Performed by: INTERNAL MEDICINE

## 2024-02-07 PROCEDURE — 96375 TX/PRO/DX INJ NEW DRUG ADDON: CPT

## 2024-02-07 PROCEDURE — 84484 ASSAY OF TROPONIN QUANT: CPT | Performed by: INTERNAL MEDICINE

## 2024-02-07 PROCEDURE — 83735 ASSAY OF MAGNESIUM: CPT | Performed by: INTERNAL MEDICINE

## 2024-02-07 PROCEDURE — 96366 THER/PROPH/DIAG IV INF ADDON: CPT

## 2024-02-07 PROCEDURE — 99232 SBSQ HOSP IP/OBS MODERATE 35: CPT | Performed by: INTERNAL MEDICINE

## 2024-02-07 PROCEDURE — 93005 ELECTROCARDIOGRAM TRACING: CPT

## 2024-02-07 RX ORDER — ACETAMINOPHEN 650 MG/1
650 SUPPOSITORY RECTAL EVERY 4 HOURS PRN
Status: DISCONTINUED | OUTPATIENT
Start: 2024-02-07 | End: 2024-02-08 | Stop reason: HOSPADM

## 2024-02-07 RX ORDER — AMOXICILLIN 250 MG
1 CAPSULE ORAL 2 TIMES DAILY PRN
Status: DISCONTINUED | OUTPATIENT
Start: 2024-02-07 | End: 2024-02-08 | Stop reason: HOSPADM

## 2024-02-07 RX ORDER — SIMVASTATIN 40 MG
40 TABLET ORAL DAILY
COMMUNITY

## 2024-02-07 RX ORDER — AMOXICILLIN 250 MG
2 CAPSULE ORAL 2 TIMES DAILY PRN
Status: DISCONTINUED | OUTPATIENT
Start: 2024-02-07 | End: 2024-02-08 | Stop reason: HOSPADM

## 2024-02-07 RX ORDER — POLYETHYLENE GLYCOL 3350 17 G/17G
17 POWDER, FOR SOLUTION ORAL 2 TIMES DAILY PRN
Status: DISCONTINUED | OUTPATIENT
Start: 2024-02-07 | End: 2024-02-08 | Stop reason: HOSPADM

## 2024-02-07 RX ORDER — ACETAMINOPHEN 325 MG/1
650 TABLET ORAL EVERY 4 HOURS PRN
Status: DISCONTINUED | OUTPATIENT
Start: 2024-02-07 | End: 2024-02-08 | Stop reason: HOSPADM

## 2024-02-07 RX ORDER — LORATADINE 10 MG/1
10 TABLET ORAL DAILY
COMMUNITY

## 2024-02-07 RX ORDER — OMEGA-3 FATTY ACIDS/FISH OIL 300-1000MG
1000 CAPSULE ORAL 2 TIMES DAILY
COMMUNITY

## 2024-02-07 RX ORDER — HYDRALAZINE HYDROCHLORIDE 20 MG/ML
10 INJECTION INTRAMUSCULAR; INTRAVENOUS EVERY 4 HOURS PRN
Status: DISCONTINUED | OUTPATIENT
Start: 2024-02-07 | End: 2024-02-08 | Stop reason: HOSPADM

## 2024-02-07 RX ORDER — PROPRANOLOL HYDROCHLORIDE 160 MG/1
160 CAPSULE, EXTENDED RELEASE ORAL 2 TIMES DAILY
COMMUNITY

## 2024-02-07 RX ORDER — CHOLECALCIFEROL (VITAMIN D3) 50 MCG
1 TABLET ORAL DAILY
COMMUNITY

## 2024-02-07 RX ORDER — MAGNESIUM SULFATE HEPTAHYDRATE 40 MG/ML
2 INJECTION, SOLUTION INTRAVENOUS ONCE
Status: COMPLETED | OUTPATIENT
Start: 2024-02-07 | End: 2024-02-07

## 2024-02-07 RX ORDER — MAGNESIUM OXIDE 400 MG/1
400 TABLET ORAL EVERY 4 HOURS
Status: COMPLETED | OUTPATIENT
Start: 2024-02-07 | End: 2024-02-07

## 2024-02-07 RX ORDER — PRIMIDONE 50 MG/1
150 TABLET ORAL 2 TIMES DAILY
Status: DISCONTINUED | OUTPATIENT
Start: 2024-02-07 | End: 2024-02-08 | Stop reason: HOSPADM

## 2024-02-07 RX ORDER — ONDANSETRON 2 MG/ML
4 INJECTION INTRAMUSCULAR; INTRAVENOUS EVERY 6 HOURS PRN
Status: DISCONTINUED | OUTPATIENT
Start: 2024-02-07 | End: 2024-02-08 | Stop reason: HOSPADM

## 2024-02-07 RX ORDER — FLECAINIDE ACETATE 50 MG/1
50 TABLET ORAL EVERY 12 HOURS SCHEDULED
Status: DISCONTINUED | OUTPATIENT
Start: 2024-02-07 | End: 2024-02-08 | Stop reason: HOSPADM

## 2024-02-07 RX ORDER — LEVOTHYROXINE SODIUM 112 UG/1
112 TABLET ORAL DAILY
COMMUNITY

## 2024-02-07 RX ORDER — PROPRANOLOL HYDROCHLORIDE 80 MG/1
80 TABLET ORAL DAILY
Status: ON HOLD | COMMUNITY
End: 2024-02-08

## 2024-02-07 RX ORDER — PROCHLORPERAZINE 25 MG
12.5 SUPPOSITORY, RECTAL RECTAL EVERY 12 HOURS PRN
Status: DISCONTINUED | OUTPATIENT
Start: 2024-02-07 | End: 2024-02-08 | Stop reason: HOSPADM

## 2024-02-07 RX ORDER — HYDRALAZINE HYDROCHLORIDE 10 MG/1
10 TABLET, FILM COATED ORAL EVERY 4 HOURS PRN
Status: DISCONTINUED | OUTPATIENT
Start: 2024-02-07 | End: 2024-02-08 | Stop reason: HOSPADM

## 2024-02-07 RX ORDER — ASPIRIN 81 MG/1
81 TABLET ORAL DAILY
Status: DISCONTINUED | OUTPATIENT
Start: 2024-02-07 | End: 2024-02-08 | Stop reason: HOSPADM

## 2024-02-07 RX ORDER — LEVOTHYROXINE SODIUM 112 UG/1
112 TABLET ORAL DAILY
Status: DISCONTINUED | OUTPATIENT
Start: 2024-02-07 | End: 2024-02-08 | Stop reason: HOSPADM

## 2024-02-07 RX ORDER — HYDROCHLOROTHIAZIDE 25 MG/1
25 TABLET ORAL DAILY
Status: ON HOLD | COMMUNITY
End: 2024-02-08

## 2024-02-07 RX ORDER — ONDANSETRON 4 MG/1
4 TABLET, ORALLY DISINTEGRATING ORAL EVERY 6 HOURS PRN
Status: DISCONTINUED | OUTPATIENT
Start: 2024-02-07 | End: 2024-02-08 | Stop reason: HOSPADM

## 2024-02-07 RX ORDER — PRIMIDONE 50 MG/1
150 TABLET ORAL 2 TIMES DAILY
COMMUNITY

## 2024-02-07 RX ORDER — PROCHLORPERAZINE MALEATE 5 MG
5 TABLET ORAL EVERY 6 HOURS PRN
Status: DISCONTINUED | OUTPATIENT
Start: 2024-02-07 | End: 2024-02-08 | Stop reason: HOSPADM

## 2024-02-07 RX ADMIN — MAGNESIUM SULFATE HEPTAHYDRATE 2 G: 40 INJECTION, SOLUTION INTRAVENOUS at 06:37

## 2024-02-07 RX ADMIN — LEVOTHYROXINE SODIUM 112 MCG: 112 TABLET ORAL at 16:19

## 2024-02-07 RX ADMIN — Medication 400 MG: at 17:44

## 2024-02-07 RX ADMIN — ASPIRIN 81 MG: 81 TABLET, COATED ORAL at 19:59

## 2024-02-07 RX ADMIN — Medication 400 MG: at 14:50

## 2024-02-07 RX ADMIN — PRIMIDONE 150 MG: 50 TABLET ORAL at 19:58

## 2024-02-07 RX ADMIN — FLECAINIDE ACETATE 50 MG: 50 TABLET ORAL at 12:16

## 2024-02-07 RX ADMIN — APIXABAN 5 MG: 5 TABLET, FILM COATED ORAL at 19:59

## 2024-02-07 RX ADMIN — FLECAINIDE ACETATE 50 MG: 50 TABLET ORAL at 19:59

## 2024-02-07 ASSESSMENT — ACTIVITIES OF DAILY LIVING (ADL)
ADLS_ACUITY_SCORE: 35

## 2024-02-07 NOTE — ED NOTES
[facility]  ED Nurse Handoff Report    ED Chief complaint: Palpitations      ED Diagnosis:   Final diagnoses:   None       Code Status: Full Code    Allergies: No Known Allergies    Patient Story:   Here with new onset a-fib RVR. Denies CP. Has some SOB with activity. Started this afternoon. Denies prior episodes of a-fib    Focused Assessment:    Neuro: Alert, oriented x 4  Respiratory:Clear lung sounds, on room air   Cardiology:  A-fib RVR, Slowed with Diltiazem bolus, and NS bolus but rate creeping up again. Initial troponin slightly elevated   Gastrointestinal: soft, non tender, non distended   Genitourinary/Renal:    Musculoskeletal: moves all extremities   Skin: Intact skin   Lines: 20 ga righ forearm    Labs Ordered and Resulted from Time of ED Arrival to Time of ED Departure   BASIC METABOLIC PANEL - Abnormal       Result Value    Sodium 138      Potassium 3.9      Chloride 100      Carbon Dioxide (CO2) 30 (*)     Anion Gap 8      Urea Nitrogen 19.2      Creatinine 1.11      GFR Estimate 68      Calcium 9.2      Glucose 112 (*)    TROPONIN T, HIGH SENSITIVITY - Abnormal    Troponin T, High Sensitivity 26 (*)    CBC WITH PLATELETS AND DIFFERENTIAL - Abnormal    WBC Count 5.4      RBC Count 4.38 (*)     Hemoglobin 14.0      Hematocrit 40.4      MCV 92      MCH 32.0      MCHC 34.7      RDW 12.9      Platelet Count 192      % Neutrophils 65      % Lymphocytes 15      % Monocytes 16      % Eosinophils 3      % Basophils 1      % Immature Granulocytes 0      NRBCs per 100 WBC 0      Absolute Neutrophils 3.6      Absolute Lymphocytes 0.8      Absolute Monocytes 0.9      Absolute Eosinophils 0.2      Absolute Basophils 0.0      Absolute Immature Granulocytes 0.0      Absolute NRBCs 0.0     TSH WITH FREE T4 REFLEX - Normal    TSH 3.50     NT PROBNP INPATIENT - Normal    N terminal Pro BNP Inpatient 789     TROPONIN T, HIGH SENSITIVITY        No orders to display         Treatments and/or  interventions provided:      Medications   sodium chloride 0.9% BOLUS 1,000 mL (0 mLs Intravenous Stopped 2/6/24 2103)   diltiazem (CARDIZEM) injection 23.25 mg (23.25 mg Intravenous $Given 2/6/24 2016)      Pt no longer on diltiazem drip    Patient's response to treatments and/or interventions:   Resting comfortably    To be done/followed up on inpatient unit:    See any in-patient orders    Does this patient have any cognitive concerns?: NA    Activity level - Baseline/Home:    Independent    Activity Level - Current:     Independent    Patient's Preferred language: English     Needed?: No    Isolation: None  Infection: Not Applicable  Patient tested for COVID 19 prior to admission: NO    Bariatric?: No    Vital Signs:   Vitals:    02/06/24 2045 02/06/24 2100 02/06/24 2115 02/06/24 2130   BP: 104/65 98/61 97/57 96/52   Pulse: 70 76 66 (!) 146   Resp: 12 14 13 11   Temp:       TempSrc:       SpO2: 93% 97% 97% 97%   Weight:       Height:  1.829 m (6')         Cardiac Rhythm: Sinus Bradycardic   Was the PSS-3 completed:   Yes  What interventions are required if any?               Family Comments: spouse at bedside, wheelchair dependent  OBS brochure/video discussed/provided to patient/family: Yes              Name of person given brochure if not patient:              Relationship to patient:    For the majority of the shift this patient's behavior was Green.   Behavioral interventions performed were     ED NURSE PHONE NUMBER: *[phone]

## 2024-02-07 NOTE — ED PROVIDER NOTES
History     Chief Complaint:  Palpitations    The history is provided by the patient.      Itz Jaimes is a 79 year old male presenting with palpitations and chest pressure. The patient reports that he began feeling queasy around 4 hours ago. His normal resting heart rate is around 50, but it jumped up to 150 at this time, according to his watch. He had a similar episode over a year ago. He has been eating and drinking okay. He has no history of atrial fibrillation and denies chest pain, recent illness, fever, chills, or cough. Note, the patient did not take his evening medications today.    Independent Historian:   None - Patient Only    Review of External Notes:   NA     Medications:    Hydrochlorothiazide  Levothyroxine  Propanolol  Primidone  Omeprazole  Clopidogrel  Calcium  Vitamin D  Claritin  Simvastatin  Fish oil  Aspirin    Past Medical History:    Lower GI bleed  CKD stage 3  Carpal tunnel syndrome  Status post total bilateral knee replacement  Primary osteoarthritis  Pure hypercholesterolemia  GERD  Vitamin D deficiency  Vertebrobasilar artery syndrome  Squamous cell carcinoma  Basal cell cancer  Prostate cancer  Osteoarthrosis  Essential hypertension  Acquired hypothyroidism  Hyperlipidemia  Degeneration of lumbar or lumbosacral intervertebral disc     Past Surgical History:    Rotator cuff repair  Vasectomy  Lumbar fusion  Knee arthroscopy  Flexible sigmoidoscopy  Total knee replacement bilaterally  Laminectomy and discectomy  Rotator cuff repair  Carpal tunnel release    Physical Exam   Patient Vitals for the past 24 hrs:   BP Temp Temp src Pulse Resp SpO2 Height Weight   02/07/24 0130 114/57 -- -- (!) 47 20 94 % -- --   02/07/24 0120 -- -- -- 50 21 96 % -- --   02/07/24 0115 108/54 -- -- 55 -- 92 % -- --   02/07/24 0000 115/60 -- -- (!) 142 16 95 % -- --   02/06/24 2330 114/67 -- -- (!) 141 16 94 % -- --   02/06/24 2315 99/72 -- -- 100 10 94 % -- --   02/06/24 2300 (!) 85/62 -- -- (!) 133 20 94  % -- --   02/06/24 2245 123/75 -- -- (!) 141 -- 97 % -- --   02/06/24 2200 100/44 -- -- 117 13 96 % -- --   02/06/24 2130 96/52 -- -- (!) 146 11 97 % -- --   02/06/24 2115 97/57 -- -- 66 13 97 % -- --   02/06/24 2100 98/61 -- -- 76 14 97 % 1.829 m (6') --   02/06/24 2045 104/65 -- -- 70 12 93 % -- --   02/06/24 2030 97/55 -- -- 60 12 96 % -- --   02/06/24 2015 135/70 -- -- 109 21 97 % -- --   02/06/24 2011 -- -- -- -- -- -- -- 93 kg (205 lb)   02/06/24 1912 (!) 137/99 98.1  F (36.7  C) Oral (!) 139 16 98 % -- 96.2 kg (212 lb)      Physical Exam  General: Resting on the bed.  Head: No obvious trauma to head.  Ears, Nose, Throat:  External ears normal.  Nose normal.  No pharyngeal erythema, swelling or exudate.  Midline uvula. Dry mucus membranes.  Eyes:  Conjunctivae clear.   Neck: Normal range of motion.  Neck supple.   CV: irregularly irregular rhythm and tachycardic rate.  No murmurs.  Trace bilaterally pulmonary edema.     Respiratory: Effort normal and breath sounds normal.  No wheezing or crackles.   Gastrointestinal: Soft.  No distension. There is no tenderness.  There is no rigidity, no rebound and no guarding.   Musculoskeletal: Normal range of motion.  Non tender extremities to palpations.    Neuro: Alert. Moving all extremities appropriately.  Normal speech.    Skin: Skin is warm and dry.  No rash noted.   Psych: Normal mood and affect. Behavior is normal.       Emergency Department Course   ECG  ECG taken at 1908, ECG read at 1915  A fib w/ RVR   Sinus rhythm noted on previous EKG dated 8/26/08.  Rate 150 bpm. QRS duration 82 ms. QT/QTc 314/496 ms. P-R-T axes - 32 84.       ECG taken at 0110, ECG read at 0121  Sinus rhythm   A fib w/ RVR noted on prior, dated 02/06/24.  Rate 48 bpm. MT interval 198 ms. QRS duration 98 ms. QT/QTc 458/409 ms. P-R-T axes 61 22 104.       Imaging:  Chest XR,  PA & LAT   Final Result   IMPRESSION: The heart is at the upper limits of normal. The lungs are clear bilaterally.          Laboratory:  Labs Ordered and Resulted from Time of ED Arrival to Time of ED Departure   BASIC METABOLIC PANEL - Abnormal       Result Value    Sodium 138      Potassium 3.9      Chloride 100      Carbon Dioxide (CO2) 30 (*)     Anion Gap 8      Urea Nitrogen 19.2      Creatinine 1.11      GFR Estimate 68      Calcium 9.2      Glucose 112 (*)    TROPONIN T, HIGH SENSITIVITY - Abnormal    Troponin T, High Sensitivity 26 (*)    CBC WITH PLATELETS AND DIFFERENTIAL - Abnormal    WBC Count 5.4      RBC Count 4.38 (*)     Hemoglobin 14.0      Hematocrit 40.4      MCV 92      MCH 32.0      MCHC 34.7      RDW 12.9      Platelet Count 192      % Neutrophils 65      % Lymphocytes 15      % Monocytes 16      % Eosinophils 3      % Basophils 1      % Immature Granulocytes 0      NRBCs per 100 WBC 0      Absolute Neutrophils 3.6      Absolute Lymphocytes 0.8      Absolute Monocytes 0.9      Absolute Eosinophils 0.2      Absolute Basophils 0.0      Absolute Immature Granulocytes 0.0      Absolute NRBCs 0.0     TROPONIN T, HIGH SENSITIVITY - Abnormal    Troponin T, High Sensitivity 34 (*)    TSH WITH FREE T4 REFLEX - Normal    TSH 3.50     NT PROBNP INPATIENT - Normal    N terminal Pro BNP Inpatient 789        Procedures   NA    Emergency Department Course & Assessments:         Interventions:  Medications   diltiazem (CARDIZEM) 125 mg in sodium chloride 0.9 % 125 mL infusion (0 mg/hr Intravenous Stopped 2/7/24 0121)   sodium chloride 0.9% BOLUS 1,000 mL (0 mLs Intravenous Stopped 2/6/24 2103)   diltiazem (CARDIZEM) injection 23.25 mg (23.25 mg Intravenous $Given 2/6/24 2016)   sodium chloride 0.9% BOLUS 1,000 mL (0 mLs Intravenous Stopped 2/7/24 0019)     Independent Interpretation (X-rays, CTs, rhythm strip):  Chest x-ray negative for consolidation, pleural effusion, pneumothorax    Assessments/Consultations/Discussion of Management or Tests:   ED Course as of 02/07/24 0239 Tue Feb 06, 2024 1959 I obtained history and  examined the patient as noted above.     2021 I rechecked the patient.    2158 I rechecked the patient. Heart rate was in the 150s.    2242 I spoke to Dr. Jeffers, hospitalist     Social Determinants of Health affecting care:   None    Disposition:  The patient was admitted to the hospital under the care of Dr. Jeffers.     Impression & Plan    CMS Diagnoses: None        Medical Decision Making:  Patient presents with heart palpitations.  EKG shows A-fib with RVR.  He is not anticoagulated.  Given his story, it is likely that the A-fib began earlier today, however we cannot be entirely sure.  For this reason electrical cardioversion is performed on, he was attempted to be cardioverted with IV diltiazem.  Chest x-ray is unremarkable.  Initial troponin is 26 and repeat troponin is 34.  TSH is within normal limits.  1 bolus of diltiazem was given, with improvement in his heart rate.  He remains in A-fib.  Blood pressures do become soft, but MAP staying above 65.  1 L of normal saline is given.  Heart rate begins to increase again.  Given he appeared sensitive to the bolus of diltiazem, they diltiazem infusion is started without a bolus.  I discussed the patient with the hospitalist, who agreed to admit the patient to their service.  Later, the patient does convert to sinus rhythm.  EKG does confirm this.  He remains in stable condition.    Diagnosis:    ICD-10-CM    1. Atrial fibrillation with RVR (H)  I48.91            Discharge Medications:  New Prescriptions    No medications on file     Scribe Disclosure:  I, Roslyn Ismael, am serving as a scribe at 8:19 PM on 2/6/2024 to document services personally performed by Maxx Castillo MD based on my observations and the provider's statements to me.     2/6/2024   Maxx Castillo MD Peery, Stephen, MD  02/07/24 8218

## 2024-02-07 NOTE — PHARMACY-ADMISSION MEDICATION HISTORY
Pharmacist Admission Medication History    Admission medication history is complete. The information provided in this note is only as accurate as the sources available at the time of the update.    Information Source(s): Patient and CareEverywhere/SureScripts via in-person    Pertinent Information: patient has detailed list at bedside of doses/schedule.     Changes made to PTA medication list:  Added: primidone, simvastatin   Deleted: amoxicillin from 2007 short course, multivitamin half tablet daily  Changed: aspirin 162 mg --> 81 mg, calcium added frequency, cetirizine --> claritin, clopidogrel added frequency, levothyroxine 100 mcg --> 112 mcg, Lovaza 2 tablets (no frequency) --> OTC fish oil 1000 mg BID, omeprazole daily --> BID, propranolol 40 mg AM/20 mg PM --> 160 mg ER BID, 80 mg IR daily, vitamin D 1000 --> 2000 units    Allergies reviewed with patient and updates made in EHR: no    Medication History Completed By: Radha Yu [facility] 2/7/2024 7:56 AM    Prior to Admission medications    Medication Sig Last Dose Taking? Auth Provider   aspirin 81 MG EC tablet Take 81 mg by mouth daily 2/5/2024 at 2000 Yes Reported, Patient   calcium carbonate (OS-LORRIE) 1500 (600 Ca) MG tablet Take 600 mg by mouth 2 times daily At 1200/2000 2/6/2024 at 1200 Yes Unknown, Entered By History   clopidogrel (PLAVIX) 75 MG tablet Take 75 mg by mouth daily 2/6/2024 at 1200 Yes Reported, Patient   hydrochlorothiazide (HYDRODIURIL) 25 MG tablet Take 25 mg by mouth daily 2/6/2024 at am Yes Unknown, Entered By History   levothyroxine (SYNTHROID/LEVOTHROID) 112 MCG tablet Take 112 mcg by mouth daily 2/6/2024 at am Yes Unknown, Entered By History   loratadine (CLARITIN) 10 MG tablet Take 10 mg by mouth daily 2/6/2024 at 1600 Yes Unknown, Entered By History   omega 3 1000 MG CAPS Take 1,000 mg by mouth 2 times daily At AM, 2000 2/6/2024 at am Yes Unknown, Entered By History   omeprazole (PRILOSEC) 20 MG DR capsule Take 20 mg by mouth 2  times daily At 1200, 2000 2/6/2024 at 1200 Yes Unknown, Entered By History   primidone (MYSOLINE) 50 MG tablet Take 150 mg by mouth 2 times daily At AM, 2000 2/6/2024 at am Yes Unknown, Entered By History   propranolol (INDERAL) 80 MG tablet Take 80 mg by mouth daily At 1200 2/6/2024 at 1200 Yes Unknown, Entered By History   propranolol ER (INDERAL LA) 160 MG 24 hr capsule Take 160 mg by mouth 2 times daily At AM, 1600 2/6/2024 at 1600 Yes Unknown, Entered By History   simvastatin (ZOCOR) 40 MG tablet Take 40 mg by mouth daily 2/5/2024 at 2000 Yes Unknown, Entered By History   vitamin D3 (CHOLECALCIFEROL) 50 mcg (2000 units) tablet Take 1 tablet by mouth daily 2/6/2024 at 1600 Yes Unknown, Entered By History

## 2024-02-07 NOTE — UTILIZATION REVIEW
"  [facility] Utilization Review  Admission Status; Secondary Review Determination     Admission Date: 2/6/2024  8:00 PM      Under the authority of the Utilization Management Committee, the utilization review process indicated a secondary review on the above patient.  The review outcome is based on review of the medical records, discussions with staff, and applying clinical experience noted on the date of the review.        (X) Observation Status Appropriate - This patient does not meet hospital inpatient criteria and is placed in observation status. If this patient's primary payer is Medicare and was admitted as an inpatient, Condition Code 44 should be used and patient status changed to \"observation\".   () Observation Status concurrent Review           RATIONALE FOR DETERMINATION   79-year-old male with history of chronic kidney disease stage II, tremors, GERD, hyperlipidemia, hypertension, hypothyroidism, vertebrobasilar artery syndrome, admitted with queasiness and chest pressure.  Patient found to have A-fib with RVR of new onset.  [id number] score 3, troponins elevated, Patient received IV diltiazem bolus and started on low-dose drip, converted to sinus bradycardia.  Patient already on propranolol and is bradycardic, has episodes of borderline blood pressures, TTE pending, started on apixaban, flecainide and reduced doses of propranolol with 7-day Zio patch monitor after discharge.  Does not meet criteria for inpatient stay, recommend continue observation status      The severity of illness, intensity of service provided, expected LOS make the care appropriate for observation status at this time.        The information on this document is developed by the utilization review team in order for the business office to ensure compliance.  This only denotes the appropriateness of proper admission status and does not reflect the quality of care rendered.         The definitions of Inpatient Status and " Observation Status used in making the determination above are those provided in the CMS Coverage Manual, Chapter 1 and Chapter 6, section 70.4.      Sincerely,       Demetris Arora MD  Physician Advisor  Utilization Review-[place]    Phone: [phone]

## 2024-02-07 NOTE — PLAN OF CARE
Observation goals  PRIOR TO DISCHARGE        Comments:     -diagnostic tests and consults completed and resulted: Not met, Echo and IP Electrophysiology    -vital signs normal or at patient baseline:Met, pt reports low heart rate    -safe disposition plan has been identified: Not met, pt from home. Waiting for test results    Nurse to notify provider when observation goals have been met and patient is ready for discharge.              PRIMARY Concern: recognized elevated heartrate with chest pressure. A-fib with RVR\    SAFETY RISK Concerns (fall risk, behaviors, etc.): none      Isolation/Type: n/a  Tests/Procedures for NEXT shift: Echo, AM troponin draw  Consults? (Pending/following, signed-off?) IP electrophysiology consult  Where is patient from? (Home, TCU, etc.): home  Other Important info for NEXT shift: Elim IRA, hearing aids need to be charge.   Anticipated DC date & active delays: TBD  _____________________________________________________________________________  SUMMARY NOTE:  Orientation/Cognitive: A&O x 4, Elim IRA  Observation Goals (Met/ Not Met): Not met  Mobility Level/Assist Equipment: indenpendent  Antibiotics & Plan (IV/po, length of tx left): IV magnesium in progress  Pain Management: denies pain  Tele/VS/O2: tele SB, VSS except low heartrate 45-49, afebrile  ABNL Lab/BG: troponin 26-34-69  Diet: NPO  Bowel/Bladder: continent  Skin Concerns: scattered scabs  Drains/Devices: PIV in place, IV magnesium replacement started  Patient Stated Goal for Today: hopefully to discharge  .

## 2024-02-07 NOTE — PROGRESS NOTES
"[facility]    Medicine Progress Note - Hospitalist Service        Date of Admission:  2/6/2024  8:00 PM    Assessment & Plan:   Itz Jaimes is a 79 year old male with past medical history including chronic kidney disease stage II, essential tremor, GERD, hyperlipidemia, hypertension, hypothyroidism, vertebrobasilar artery syndrome who presents with queasiness and chest pressure. Admitted on 2/6/2024.      Atrial fibrillation with RVR, new onset  Sinus bradycardia  Mild troponin elevation   *Presents with \"queasy\" feeling and mild chest pressure, HR on watch into 140s   *EKG with atrial fibrillation with RVR. Labile heart rates in ED after IV diltiazem bolus and on low dose infusion, converted to sinus bradycardia.   *CHADS2-VASc score 3 (HTN, Age x2)  *On high dose propranolol (400 mg/day) at baseline for essential tremor, baseline HR in 50s per report  *Troponin 26->34->69, likely secondary to increased demand in setting of RVR. Asymptomatic with HR controlled.   *TSH normal, magnesium 1.4  *Reports similar episode ~1 year ago, by time of evaluation in ED heart rate/rhythm unremarkable  -Evaluated by electrophysiology and started on flecainide 50 mg twice a day  -They recommended it was okay to continue propranolol however.  Decreasing dose to 160 mg twice a day, this was discussed with the patient he is okay.  -Started on apixaban for anticoagulation  -Await echo  -Monitor heart rate closely, still bradycardic with heart rate in the 40     Hypomagnesemia   -Monitor and replace per protocol       Vertebrobasilar artery syndrome  Manage chronically with aspirin and clopidogrel  -As patient has been started on apixaban, Plavix will be discontinued  -Continue prior to admission aspirin     GERD  -Resume PPI at discharge     Hypothyroidism  -Resume levothyroxine      Hypertension  -Noted EP comments regarding hydrochlorothiazide.  Will consider switching to amlodipine     Chronic kidney " "disease, stage 2  Baseline creatinine around 1.1. Creatinine 1.11 on admission.     Diet: Regular Diet Adult     DVT Prophylaxis: DOAC   Cos[place] Catheter: Not present  Code Status: Full Code     Disposition Plan       Expected Discharge Date: 02/08/2024        Discharge Comments: Cardiology   EP      Entered: Fam Alexandre MD 02/07/2024, 12:33 PM        Clinically Significant Risk Factors Present on Admission            # Hypomagnesemia: Lowest Mg = 1.4 mg/dL in last 2 days, will replace as needed     # Drug Induced Platelet Defect: home medication list includes an antiplatelet medication        # Overweight: Estimated body mass index is 27.98 kg/m  as calculated from the following:    Height as of this encounter: 1.854 m (6' 1\").    Weight as of this encounter: 96.2 kg (212 lb 1.3 oz).                   The patient's care was discussed with the Bedside Nurse and Patient.    Medical Decision Making       **CLEAR ALL SELECTIONS**      Labs/Imaging Reviewed:  See Information above and Data section below  Time SPENT BY ME on the date of service doing chart review, history, exam, documentation & further activities per the note:  35 MINUTES    Chart documentation was completed, in part, with [employer] voice-recognition software. Even though reviewed, some grammatical, spelling, and word errors may remain.    Fam Alexandre MD  Hospitalist Service  [facility]  Text Page 7AM-6PM  Securely message with the Vocera Web Console (learn more here)  Text page via [employer] Paging/Directory    ______________________________________________________________________    Interval History   Converted to normal sinus rhythm but he has been bradycardic with heart rate in the 40s.  Denies lightheadedness or dizziness.  On reviewing his Fitbit data he does appear to run in the 40s pretty much all the time.  Denies chest pain or dyspnea    Data reviewed today: I reviewed all medications, new labs and imaging results " "over the last 24 hours. I personally reviewed the EKG tracing showing sinus bradycardia .    Physical Exam   Vital signs:  Temp: 97.7  F (36.5  C) Temp src: Oral BP: 104/57 Pulse: (!) 43   Resp: 18 SpO2: 96 % O2 Device: None (Room air)   Height: 185.4 cm (6' 1\") Weight: 96.2 kg (212 lb 1.3 oz)  Estimated body mass index is 27.98 kg/m  as calculated from the following:    Height as of this encounter: 1.854 m (6' 1\").    Weight as of this encounter: 96.2 kg (212 lb 1.3 oz).      Wt Readings from Last 2 Encounters:   02/07/24 96.2 kg (212 lb 1.3 oz)   04/16/07 96.3 kg (212 lb 3.2 oz)       Gen: AAOX3, NAD, comfortable  Resp: CTA B/L, normal WOB  CVS: RRR, no murmur  Abd/GI: Soft, non-tender. BS- normoactive.    Skin: Warm, dry no rashes  MSK:  no pedal edema  Neuro- CN- intact. No focal deficits.     Data   Recent Labs   Lab 02/06/24  1920   WBC 5.4   HGB 14.0   MCV 92         POTASSIUM 3.9   CHLORIDE 100   CO2 30*   BUN 19.2   CR 1.11   ANIONGAP 8   LORRIE 9.2   *       Recent Results (from the past 24 hour(s))   Chest XR,  PA & LAT    Narrative    EXAM: XR CHEST 2 VIEWS  LOCATION: [facility]  DATE: 2/6/2024    INDICATION: new onset A fib w  RVR  COMPARISON: None.      Impression    IMPRESSION: The heart is at the upper limits of normal. The lungs are clear bilaterally.     Medications    Reason anticoagulant not prescribed for atrial fibrillation      - MEDICATION INSTRUCTIONS -        apixaban ANTICOAGULANT  5 mg Oral BID    flecainide  50 mg Oral Q12H [facility] (08/20)                  "

## 2024-02-07 NOTE — H&P
"[facility]    History and Physical - Hospitalist Service       Date of Admission:  2/6/2024    Assessment & Plan   Itz Jaimes is a 79 year old male with past medical history including chronic kidney disease stage II, essential tremor, GERD, hyperlipidemia, hypertension, hypothyroidism, vertebrobasilar artery syndrome who presents with queasiness and chest pressure. Admitted on 2/6/2024.     Atrial fibrillation with RVR, new onset  Sinus bradycardia  Mild troponin elevation   *Presents with \"queasy\" feeling and mild chest pressure, HR on watch into 140s   *EKG with atrial fibrillation with RVR. Labile heart rates in ED after IV diltiazem bolus and on low dose infusion, converted to sinus bradycardia.   *CHADS2-VASc score 3 (HTN, Age x2)  *On high dose propranolol (400 mg/day) at baseline for essential tremor, baseline HR in 50s per report  *Troponin 26->34->69, likely secondary to increased demand in setting of RVR. Asymptomatic with HR controlled.   *TSH normal, magnesium 1.4  *Reports similar episode ~1 year ago, by time of evaluation in ED heart rate/rhythm unremarkable  - Echocardiogram  - Telemetry   - EP consulted to evaluate for rhythm control strategy, indication for AVN ablation/PPM given low resting HR and already on high dose beta blocker  - NPO until seen by cardiology   - Discussed risks/benefits of anticoagulation, agreeable to start apixaban, will hold pending EP evaluation in event or procedure  - Pharmacy liaison consulted for DOAC coverage  - Trend troponin to peak     Hypomagnesemia   - Monitor and replace per protocol      Vertebrobasilar artery syndrome  Manage chronically with aspirin and clopidogrel  - Anticipate discontinuing aspirin when anticoagulation started to avoid triple therapy    GERD  - Resume prior to admission PPI when dose confirmed by pharmacy     Hypothyroidism  - Resume prior to admission levothyroxine when dose confirmed by " pharmacy    Hypertension  - Hold prior to admission hydrochlorothiazide for BP room for rate control if needed    Chronic kidney disease, stage 2  Baseline creatinine around 1.1. Creatinine 1.11 on admission.   - Currently around baseline  - Avoid nephrotoxins  - Monitor BMP          Diet: NPO per Anesthesia Guidelines for Procedure/Surgery Except for: Meds   DVT Prophylaxis: DOAC  [place] Catheter: Not present  Code Status: Full code    Disposition Plan   [place] to observation status. Anticipate discharge within 24 hours if clinically improved.     Entered: Herve Jeffers MD 02/07/2024, 4:55 AM     The patient's care was discussed with the ED provider, patient      Medical Decision Making       80 MINUTES SPENT BY ME on the date of service doing chart review, history, exam, documentation & further activities per the note.      Clinically Significant Risk Factors Present on Admission            # Hypomagnesemia: Lowest Mg = 1.4 mg/dL in last 2 days, will replace as needed     # Drug Induced Platelet Defect: home medication list includes an antiplatelet medication        # Overweight: Estimated body mass index is 27.8 kg/m  as calculated from the following:    Height as of this encounter: 1.829 m (6').    Weight as of this encounter: 93 kg (205 lb).                   Herve Jeffers MD  [facility]    ______________________________________________________________________    Chief Complaint   Queasiness, chest pressure    History of Present Illness   Itz Jaimes is a 79 year old male who presents with the above chief complaint.    History is obtained from the patient, discussion with ED provider and review of medical record. The patient reports he was at [employer] the day of presentation when he felt the acute onset of queasiness with associated mild chest pressure that felt like heartburn.  He thought perhaps he was just hungry, however persisted to have the symptoms when he got  home and manually checked his Fitbit which indicated a heart rate into the 140s.  He denies any associated palpitations or sensation of his heart racing despite his elevated rates.  He reports he had a similar constellation of symptoms about 1 year ago, sought care in the ED and by the time he was hooked up to a monitor his heart rhythm was normal, denies any prior diagnosis of atrial fibrillation.  He has a history of vertebral-basilar syndrome, but no prior diagnosis of CVA.  Denies any prior MI, VTE.  At present he is asymptomatic while in sinus rhythm.  He reports he has otherwise been in his usual state of health recently. He denies any caffeine use.     In the Emergency Department, the patient was seen by Dr. Castillo, with whom I discussed the patient's presenting symptoms and emergency department course.  Initial vital signs were a temperature of 98.1F, , /99, RR 16, SpO2 98% on room air. Pertinent work-up as noted in A&P. The patient received IV diltiazem, IVF and Hospitalists were contacted for admission to the hospital.     Past Medical History    I have reviewed this patient's medical history and updated it with pertinent information if needed.   Past Medical History:   Diagnosis Date    Chronic kidney disease, stage 2 (mild)     Degeneration of lumbar or lumbosacral intervertebral disc     Essential tremor     GERD (gastroesophageal reflux disease)     Hyperlipidemia     Hypertension     Hypothyroidism     Vertebrobasilar artery syndrome     Vitamin D deficiency        Past Surgical History   I have reviewed this patient's surgical history and updated it with pertinent information if needed.  Past Surgical History:   Procedure Laterality Date    LUMBAR FUSION      L3-S1    REPLACEMENT TOTAL KNEE Bilateral     ROTATOR CUFF REPAIR RT/LT           Social History   I have reviewed this patient's social history and updated it with pertinent information if needed.  Social History     Tobacco Use     Smoking status: Never   Substance Use Topics    Alcohol use: Yes     Comment: Occasional          Family History   I have reviewed this patient's family history and updated it with pertinent information if needed.   Family History   Problem Relation Age of Onset    Hypertension Mother     Prostate Cancer Father     Hypertension Sister         Prior to Admission Medications  - Pending pharmacy reconciliation   Prior to Admission Medications   Prescriptions Last Dose Informant Patient Reported? Taking?   AMOXICILLIN 875 MG OR TABS   No No   Si tab P.O. BID   CALCIUM PO   Yes No   Sig: Take 600 mg by mouth   HYDROCHLOROTHIAZIDE 25 MG OR TABS   Yes No   Si TABLET DAILY   LEVOTHYROXINE SODIUM 100 MCG OR TABS   Yes No   Si TABLET DAILY   MULTI-VITAMIN OR TABS   Yes No   Si.5 tablet per day   OMEPRAZOLE 20 MG OR CPDR   Yes No   Si CAPSULE DAILY   PROPRANOLOL HCL 20 MG OR TABS   Yes No   Si tablets in am and 1 tablet in pm   Vitamin D3 (VITAMIN D-1000 MAX ST) 25 mcg (1000 units) tablet   Yes No   Sig: Take 1,000 Units by mouth   aspirin 81 MG EC tablet   Yes No   Sig: Take 162 mg by mouth   cetirizine (ZYRTEC) 10 MG tablet   Yes Yes   clopidogrel (PLAVIX) 75 MG tablet   Yes Yes   Sig: Take 75 mg by mouth   omega-3 acid ethyl esters (LOVAZA) 1 g capsule   Yes No   Sig: Take 2 capsules by mouth      Facility-Administered Medications: None     Allergies   No Known Allergies    Physical Exam   Vital Signs: Temp: 98.1  F (36.7  C) Temp src: Oral BP: 114/57 Pulse: (!) 48   Resp: 17 SpO2: 94 % O2 Device: None (Room air)    Weight: 205 lbs 0 oz    Constitutional: Well-appearing, NAD   Respiratory: Clear to auscultation bilaterally, good air movement, normal effort   Cardiovascular: Regular rhythm with bradycardic rate, no m/r/g. No peripheral edema.   GI: Soft, non-tender, non-distended. No rebound tenderness or guarding.    Skin: Warm, dry   Neurologic: Alert. Responding to questions appropriately.  Following commands.   Psychiatric: Normal affect, appropriate      Data   Data reviewed today: I reviewed all medications, new labs and imaging results over the last 24 hours. I personally reviewed the EKG tracing showing atrial fibrillation with RVR, ST depressions in II, III, aVF, V4-V6, subsequently with sinus bradycardia with T wave inversions in I, aVL .    Recent Labs   Lab 02/06/24  1920   WBC 5.4   HGB 14.0   MCV 92         POTASSIUM 3.9   CHLORIDE 100   CO2 30*   BUN 19.2   CR 1.11   ANIONGAP 8   LORRIE 9.2   *       Recent Results (from the past 24 hour(s))   Chest XR,  PA & LAT    Narrative    EXAM: XR CHEST 2 VIEWS  LOCATION: [facility]  DATE: 2/6/2024    INDICATION: new onset A fib w  RVR  COMPARISON: None.      Impression    IMPRESSION: The heart is at the upper limits of normal. The lungs are clear bilaterally.

## 2024-02-07 NOTE — PROGRESS NOTES
RECEIVING UNIT ED HANDOFF REVIEW    ED Nurse Handoff Report was reviewed by: Alexia Garces RN on February 7, 2024 at 3:55 AM

## 2024-02-07 NOTE — ED TRIAGE NOTES
Pt c/o palpations with chest pressure starting at 1600. Pt states he noticed his HR jumping in the 140's on his smart watch. Pt denies pain. Pt states he takes baby Asprin daily. Pt denies cardiac hx       Triage Assessment (Adult)       Row Name 02/06/24 1913          Triage Assessment    Airway WDL WDL        Respiratory WDL    Respiratory WDL WDL        Skin Circulation/Temperature WDL    Skin Circulation/Temperature WDL WDL        Cardiac WDL    Cardiac WDL X     Cardiac Rhythm ST        Peripheral/Neurovascular WDL    Peripheral Neurovascular WDL WDL        Cognitive/Neuro/Behavioral WDL    Cognitive/Neuro/Behavioral WDL WDL

## 2024-02-07 NOTE — CONSULTS
Patient has Medicare Advantage through [facility].    Xarelto/Eliquis  --$35/mo ($70 for 3 mo at [facility])  --lf/when total drug costs exceed $5,030, price will increase to a 25% coinsurance, equivalent to $146/mo.    Diandra Anderson  Pharmacy Technician/Liaison, Discharge Pharmacy   [phone] (voice or text)  [email or web address]

## 2024-02-08 ENCOUNTER — APPOINTMENT (OUTPATIENT)
Dept: CARDIOLOGY | Facility: CLINIC | Age: 79
End: 2024-02-08
Attending: INTERNAL MEDICINE
Payer: COMMERCIAL

## 2024-02-08 VITALS
DIASTOLIC BLOOD PRESSURE: 59 MMHG | BODY MASS INDEX: 28.11 KG/M2 | RESPIRATION RATE: 18 BRPM | HEART RATE: 56 BPM | TEMPERATURE: 98.4 F | SYSTOLIC BLOOD PRESSURE: 101 MMHG | HEIGHT: 73 IN | WEIGHT: 212.08 LBS | OXYGEN SATURATION: 95 %

## 2024-02-08 LAB
LVEF ECHO: NORMAL
MAGNESIUM SERPL-MCNC: 1.9 MG/DL (ref 1.7–2.3)

## 2024-02-08 PROCEDURE — 99239 HOSP IP/OBS DSCHRG MGMT >30: CPT | Performed by: INTERNAL MEDICINE

## 2024-02-08 PROCEDURE — 83735 ASSAY OF MAGNESIUM: CPT | Performed by: INTERNAL MEDICINE

## 2024-02-08 PROCEDURE — 250N000013 HC RX MED GY IP 250 OP 250 PS 637: Performed by: INTERNAL MEDICINE

## 2024-02-08 PROCEDURE — G0378 HOSPITAL OBSERVATION PER HR: HCPCS

## 2024-02-08 PROCEDURE — 93005 ELECTROCARDIOGRAM TRACING: CPT

## 2024-02-08 PROCEDURE — 93306 TTE W/DOPPLER COMPLETE: CPT | Mod: 26 | Performed by: INTERNAL MEDICINE

## 2024-02-08 PROCEDURE — 93306 TTE W/DOPPLER COMPLETE: CPT

## 2024-02-08 PROCEDURE — 36415 COLL VENOUS BLD VENIPUNCTURE: CPT | Performed by: INTERNAL MEDICINE

## 2024-02-08 PROCEDURE — 93010 ELECTROCARDIOGRAM REPORT: CPT | Mod: XE | Performed by: INTERNAL MEDICINE

## 2024-02-08 RX ORDER — MAGNESIUM OXIDE 400 MG/1
400 TABLET ORAL EVERY 4 HOURS
Status: COMPLETED | OUTPATIENT
Start: 2024-02-08 | End: 2024-02-08

## 2024-02-08 RX ORDER — FLECAINIDE ACETATE 50 MG/1
50 TABLET ORAL EVERY 12 HOURS
Qty: 60 TABLET | Refills: 1 | Status: SHIPPED | OUTPATIENT
Start: 2024-02-08 | End: 2024-03-11

## 2024-02-08 RX ORDER — PROPRANOLOL HYDROCHLORIDE 160 MG/1
160 CAPSULE, EXTENDED RELEASE ORAL 2 TIMES DAILY
Status: DISCONTINUED | OUTPATIENT
Start: 2024-02-08 | End: 2024-02-08 | Stop reason: HOSPADM

## 2024-02-08 RX ADMIN — APIXABAN 5 MG: 5 TABLET, FILM COATED ORAL at 09:27

## 2024-02-08 RX ADMIN — LEVOTHYROXINE SODIUM 112 MCG: 112 TABLET ORAL at 09:27

## 2024-02-08 RX ADMIN — PROPRANOLOL HYDROCHLORIDE 160 MG: 160 CAPSULE, EXTENDED RELEASE ORAL at 11:23

## 2024-02-08 RX ADMIN — Medication 400 MG: at 09:27

## 2024-02-08 RX ADMIN — Medication 400 MG: at 11:23

## 2024-02-08 RX ADMIN — FLECAINIDE ACETATE 50 MG: 50 TABLET ORAL at 09:27

## 2024-02-08 RX ADMIN — PRIMIDONE 150 MG: 50 TABLET ORAL at 09:27

## 2024-02-08 ASSESSMENT — ACTIVITIES OF DAILY LIVING (ADL)
ADLS_ACUITY_SCORE: 35

## 2024-02-08 NOTE — PLAN OF CARE
Observation goals  PRIOR TO DISCHARGE        Comments:      -diagnostic tests and consults completed and resulted: Not met    -vital signs normal or at patient baseline:Met, ex HR low    -safe disposition plan has been identified: Not met    Nurse to notify provider when observation goals have been met and patient is ready for discharge.

## 2024-02-08 NOTE — PLAN OF CARE
PRIMARY Concern: a-fib w/RVR, now SB    SAFETY RISK Concerns (fall risk, behaviors, etc.): none      Isolation/Type: n/a  Tests/Procedures for NEXT shift: Echo  Consults? (Pending/following, signed-off?) IP electrophysiology consult  Where is patient from? (Home, TCU, etc.): Home  Other Important info for NEXT shift: [place] (needs  for hearing aids), magnesium protocol (am recheck)  Anticipated DC date & active delays: Pending echo (Zio patch)  _____________________________________________________________________________  SUMMARY NOTE:  Orientation/Cognitive: A&Ox4, [place]  Observation Goals (Met/ Not Met): Not met  Mobility Level/Assist Equipment: independent in the room   Antibiotics & Plan (IV/po, length of tx left): n/a  Pain Management: denies pain  Tele/VS/O2: tele SB, VSS on RA except low heartrate 40-50  ABNL Lab/BG: troponin 26-34-69-87, magnesium 2.0  Diet: Regular  Bowel/Bladder: continent, up to the bathroom   Skin Concerns: scattered scabs  Drains/Devices: PIV SL

## 2024-02-08 NOTE — PLAN OF CARE
PRIMARY Concern: a-fib w/RVR, now SB    SAFETY RISK Concerns (fall risk, behaviors, etc.): none      Isolation/Type: n/a  Tests/Procedures for NEXT shift: Echo pending  Consults? (Pending/following, signed-off?) IP electrophysiology consult following  Where is patient from? (Home, TCU, etc.): Home  Other Important info for NEXT shift: [place] (needs  for hearing aids), magnesium protocol (am recheck)  Anticipated DC date & active delays: TBD (Zio patch)  _____________________________________________________________________________  SUMMARY NOTE  Orientation/Cognitive: A&Ox4  Observation Goals (Met/ Not Met): Not met  Mobility Level/Assist Equipment: Independent in the room   Antibiotics & Plan (IV/po, length of tx left): n/a  Pain Management: denies pain  Tele/VS/O2: tele SB, VSS on RA except low HR 40-50   ABNL Lab/BG: troponin 26-34-69-87, magnesium 2.0  Diet: Regular, good intake  Bowel/Bladder: continent, up to the bathroom   Skin Concerns: scattered scabs  Drains/Devices: PIV SL

## 2024-02-08 NOTE — DISCHARGE SUMMARY
"[facility]    Discharge Summary  Hospitalist    Date of Admission:  2/6/2024  Date of Discharge:  2/8/2024  Discharging Provider: Fam Alexandre MD    Discharge Diagnoses       Atrial fibrillation with RVR, new onset  Sinus bradycardia  Mild troponin elevation   Hypomagnesemia   Vertebrobasilar artery syndrome  GERD  Hypothyroidism  Hypertension  Chronic kidney disease, stage 2      Hospital Course:    Itz Jaimes is a 79 year old male with past medical history including chronic kidney disease stage II, essential tremor, GERD, hyperlipidemia, hypertension, hypothyroidism, vertebrobasilar artery syndrome who presents with queasiness and chest pressure. Admitted on 2/6/2024.      Atrial fibrillation with RVR, new onset  Sinus bradycardia  Mild troponin elevation   *Presents with \"queasy\" feeling and mild chest pressure, HR on watch into 140s   *EKG with atrial fibrillation with RVR. Labile heart rates in ED after IV diltiazem bolus and on low dose infusion, converted to sinus bradycardia.   *CHADS2-VASc score 3 (HTN, Age x2)  *On high dose propranolol (400 mg/day) at baseline for essential tremor, baseline HR in 50s per report  *Troponin 26->34->69, likely secondary to increased demand in setting of RVR. Asymptomatic with HR controlled.   *TSH normal, magnesium 1.4  *Reports similar episode ~1 year ago, by time of evaluation in ED heart rate/rhythm unremarkable  -Evaluated by electrophysiology and started on flecainide 50 mg twice a day  -They recommended decreasing propranolol dose to 160 mg twice a day, this was discussed with the patient he is okay.  -Started on apixaban for anticoagulation  -Echo with EF of 55-60%, moderate mitral regurgitation.  Mild pulmonary hypertension.  -EP was okay with discharge home with Zio patch x 1 week with close follow-up     Hypomagnesemia   -Monitor and replace per protocol       Vertebrobasilar artery syndrome  Manage chronically with aspirin " and clopidogrel  -As patient has been started on apixaban, Plavix will be discontinued  -Continue prior to admission aspirin     GERD  -Resume PPI at discharge     Hypothyroidism  -Continue levothyroxine      Hypertension  -Patient is normotensive at the moment, continue to hold hydrochlorothiazide.  Continue on propranolol alone for now.     Chronic kidney disease, stage 2  Baseline creatinine around 1.1. Creatinine 1.11 on admission.    Fam Alexandre MD, MD    Significant Results and Procedures   See below    Pending Results     Unresulted Labs Ordered in the Past 30 Days of this Admission       No orders found from 1/7/2024 to 2/7/2024.            Code Status   Full Code       Primary Care Physician   Physician No Ref-Primary    Physical Exam   Temp: 98  F (36.7  C) Temp src: Oral BP: 127/66 Pulse: 53   Resp: 18 SpO2: 96 % O2 Device: None (Room air)      Constitutional: AAOX3, NAD  Respiratory: CTA B/L, Normal WOB  Cardiovascular: Bradycardic, No murmur  GI: Soft, Non- tender, BS- normoactive  Neuro: CN- grossly intact, Moving on all 4 extremities.     Discharge Disposition   Discharged to home  Condition at discharge: Stable    Consultations This Hospital Stay   PHARMACY LIAISON FOR MEDICATION COVERAGE CONSULT  ELECTROPHYSIOLOGY IP CONSULT    Time Spent on this Encounter   I, Fam Alexandre MD, personally saw the patient today and spent greater than 30 minutes discharging this patient.    Discharge Orders      ZIO PATCH 3-7 DAYS APPLICATION     Reason for your hospital stay    Atrial fibrillation with RVR     Follow-up and recommended labs and tests     Follow up with primary care provider, within 7 days for hospital follow- up.   EP in 1 week     Activity    Your activity upon discharge: activity as tolerated     ZIO PATCH 3-7 DAYS (additional cost to patient)    This order will incur an additional cost to the patient. If the patient doesn't want to pay cost for application, please consider the Zio Patch  Mail Out order.     Diet    Follow this diet upon discharge: Orders Placed This Encounter      Regular Diet Adult     Discharge Medications   Current Discharge Medication List        START taking these medications    Details   apixaban ANTICOAGULANT (ELIQUIS) 5 MG tablet Take 1 tablet (5 mg) by mouth 2 times daily  Qty: 60 tablet, Refills: 1    Comments: Future refills by PCP or cardiology  Associated Diagnoses: Atrial fibrillation with RVR (H)      flecainide (TAMBOCOR) 50 MG tablet Take 1 tablet (50 mg) by mouth every 12 hours  Qty: 60 tablet, Refills: 1    Comments: Future refills by PCP/cardiology  Associated Diagnoses: Atrial fibrillation with RVR (H)           CONTINUE these medications which have NOT CHANGED    Details   aspirin 81 MG EC tablet Take 81 mg by mouth daily      calcium carbonate (OS-LORRIE) 1500 (600 Ca) MG tablet Take 600 mg by mouth 2 times daily At 1200/2000      levothyroxine (SYNTHROID/LEVOTHROID) 112 MCG tablet Take 112 mcg by mouth daily      loratadine (CLARITIN) 10 MG tablet Take 10 mg by mouth daily      omega 3 1000 MG CAPS Take 1,000 mg by mouth 2 times daily At AM, 2000      omeprazole (PRILOSEC) 20 MG DR capsule Take 20 mg by mouth 2 times daily At 1200, 2000      primidone (MYSOLINE) 50 MG tablet Take 150 mg by mouth 2 times daily At AM, 2000      propranolol ER (INDERAL LA) 160 MG 24 hr capsule Take 160 mg by mouth 2 times daily At AM, 1600      simvastatin (ZOCOR) 40 MG tablet Take 40 mg by mouth daily      vitamin D3 (CHOLECALCIFEROL) 50 mcg (2000 units) tablet Take 1 tablet by mouth daily           STOP taking these medications       hydrochlorothiazide (HYDRODIURIL) 25 MG tablet Comments:   Reason for Stopping:         propranolol (INDERAL) 80 MG tablet Comments:   Reason for Stopping:             Allergies   No Known Allergies  Data   Most Recent 3 CBC's:  Recent Labs   Lab Test 02/06/24  1920   WBC 5.4   HGB 14.0   MCV 92         Most Recent 3 BMP's:  Recent Labs   Lab  Test 24      POTASSIUM 3.9   CHLORIDE 100   CO2 30*   BUN 19.2   CR 1.11   ANIONGAP 8   LORRIE 9.2   *     Most Recent 2 LFT's:No lab results found.  Most Recent INR's and Anticoagulation Dosing History:  Anticoagulation Dose History           No data to display              Most Recent 3 Troponin's:No lab results found.  Most Recent Cholesterol Panel:No lab results found.  Most Recent 6 Bacteria Isolates From Any Culture (See EPIC Reports for Culture Details):No lab results found.  Most Recent TSH, T4 and A1c Labs:  Recent Labs   Lab Test 24   TSH 3.50       Results for orders placed or performed during the hospital encounter of 24   Chest XR,  PA & LAT    Narrative    EXAM: XR CHEST 2 VIEWS  LOCATION: [facility]  DATE: 2024    INDICATION: new onset A fib w  RVR  COMPARISON: None.      Impression    IMPRESSION: The heart is at the upper limits of normal. The lungs are clear bilaterally.   Echocardiogram Complete     Value    LVEF  55-60%    Narrative    [id number]  [id number]  [id number]  [id number]^HI^BENY^STEPHIE     [facility]  Echocardiography Laboratory  [address] [place] [address]     Name: KAMALA VILLA  MRN: [id number]  : 1945  Study Date: 2024 08:55 AM  Age: 79 yrs  Gender: Male  Patient Location: [facility]  Reason For Study: Atrial Fibrillation  Ordering Physician: BENY DO  Referring Physician: BENY DO  Performed By: Patel Valle     BSA: 2.2 m2  Height: 72 in  Weight: 205 lb  HR: 58  BP: 114/57 mmHg  ______________________________________________________________________________  Procedure  Complete Echo Adult.  ______________________________________________________________________________  Interpretation Summary     Probably normal left ventricular systolic function. However endocardial  definition was not optimal and subtle regional wall motion abnormalities  could  be missed. Contrast would enhance regional wall motion analysis.  The visual ejection fraction is 55-60%.  There is moderate (2+) mitral regurgitation.  Right ventricular systolic pressure is elevated, consistent with mild  pulmonary hypertension.  There is mild (1+) aortic regurgitation.  The study was technically difficult.  ______________________________________________________________________________  Left Ventricle  The left ventricle is normal in size. There is normal left ventricular wall  thickness. Diastolic Doppler findings (E/E' ratio and/or other parameters)  suggest left ventricular filling pressures are indeterminate. The visual  ejection fraction is 55-60%. Probably normal left ventricular systolic  function. However endocardial definition was not optimal and subtle regional  wall motion abnormalities could be missed. Contrast would enhance regional  wall motion analysis.     Right Ventricle  The right ventricle is normal in size and function.     Atria  Normal left atrial size. The right atrium is mildly dilated. There is no color  Doppler evidence of an atrial shunt.     Mitral Valve  There is moderate (2+) mitral regurgitation.     Tricuspid Valve  There is mild (1+) tricuspid regurgitation. The right ventricular systolic  pressure is approximated at 36.6 mmHg plus the right atrial pressure. IVC  diameter <2.1 cm collapsing >50% with sniff suggests a normal RA pressure of 3  mmHg. Right ventricular systolic pressure is elevated, consistent with mild  pulmonary hypertension.     Aortic Valve  The aortic valve is trileaflet. There is trivial trileaflet aortic sclerosis.  There is mild (1+) aortic regurgitation. No hemodynamically significant  valvular aortic stenosis.     Pulmonic Valve  There is trace pulmonic valvular regurgitation. Normal pulmonic valve  velocity.     Vessels  The aortic root is normal size. Normal size ascending aorta. IVC diameter <2.1  cm collapsing >50% with sniff  suggests a normal RA pressure of 3 mmHg.     Pericardium  There is no pericardial effusion.     Rhythm  Sinus rhythm was noted.  ______________________________________________________________________________  MMode/2D Measurements & Calculations  IVSd: 0.93 cm     LVIDd: 5.5 cm  LVIDs: 3.7 cm  LVPWd: 0.98 cm  FS: 32.5 %  LV mass(C)d: 204.0 grams  LV mass(C)dI: 94.8 grams/m2  Ao root diam: 3.7 cm  LA dimension: 3.9 cm  asc Aorta Diam: 3.8 cm  LA/Ao: 1.1  LVOT diam: 2.0 cm  LVOT area: 3.1 cm2  Ao root diam index Ht(cm/m): 2.0  Ao root diam index BSA (cm/m2): 1.7  Asc Ao diam index BSA (cm/m2): 1.7  Asc Ao diam index Ht(cm/m): 2.1  LA Volume (BP): 59.5 ml     LA Volume Index (BP): 27.7 ml/m2  RV Base: 3.9 cm  RWT: 0.35  TAPSE: 3.4 cm     Doppler Measurements & Calculations  MV E max adalberto: 79.3 cm/sec  MV A max adalberto: 57.0 cm/sec  MV E/A: 1.4  MV dec time: 0.20 sec  Ao V2 max: 136.0 cm/sec  Ao max P.0 mmHg  Ao V2 mean: 92.3 cm/sec  Ao mean P.0 mmHg  Ao V2 VTI: 32.7 cm  LAURA(I,D): 2.1 cm2  LAURA(V,D): 2.1 cm2  AI P1/2t: 535.8 msec  LV V1 max PG: 3.5 mmHg  LV V1 max: 93.8 cm/sec  LV V1 VTI: 21.7 cm  MR PISA: 2.4 cm2  MR ERO: 0.13 cm2  MR volume: 28.7 ml  SV(LVOT): 67.1 ml  SI(LVOT): 31.1 ml/m2  PA acc time: 0.06 sec  TR max adalberto: 302.7 cm/sec  TR max P.6 mmHg     AV Adalberto Ratio (DI): 0.69  LAURA Index (cm2/m2): 0.95  E/E' av.0  Lateral E/e': 8.6  Medial E/e': 9.5  RV S Adalberto: 14.9 cm/sec     ______________________________________________________________________________  Report approved by: Andrea Cr 2024 10:50 AM

## 2024-02-08 NOTE — PROGRESS NOTES
EP Chart Check only. Awaiting echo results to ensure flecainide is appropriate.  Remains in SR by review of RN notes and EKG this AM 2/8 showing SB 56 bpm.    If echo shows nl LVEF and we can continue flecainide, will plan 7 day ZioPatch on discharge and follow-up appt.  Confirmed with Dr. Parisi that EKG not required until follow-up appt.    I will order ZP and arrange follow-up once I see results of echo.    ADDENDUM 2/8/2024 @ 1320:  Echo confirms normal LVEF 55-60%.        Continue plan for flecainide 50 mg BID.  It appears hospitalist has already arranged 7-day Zio patch.    Follow-up arranged in our office with Monday 3/4/2024 @ 10 AM with EVERTON Mccloud  .      RANJAN Hare PA

## 2024-02-08 NOTE — PLAN OF CARE
Observation goals  PRIOR TO DISCHARGE        Comments:      -diagnostic tests and consults completed and resulted: Not met    -vital signs normal or at patient baseline:Met    -safe disposition plan has been identified: Not met    Nurse to notify provider when observation goals have been met and patient is ready for discharge.

## 2024-02-08 NOTE — PLAN OF CARE
Observation goals  PRIOR TO DISCHARGE        Comments:      -diagnostic tests and consults completed and resulted: Not met    -vital signs normal or at patient baseline:Met, ex HR low    -safe disposition plan has been identified: Met    Nurse to notify provider when observation goals have been met and patient is ready for discharge.

## 2024-02-08 NOTE — PROGRESS NOTES
PRIMARY Concern: A-fib w/RVR  SAFETY RISK Concerns (fall risk, behaviors, etc.): NA       Isolation/Type: NA   Tests/Procedures for NEXT shift: NA   Consults? (Pending/following, signed-off?) Signed off   Where is patient from? (Home, TCU, etc.): Home   Other Important info for NEXT shift: Zio patch to be mailed to home   Anticipated DC date & active delays: Today   _____________________________________________________________________________  SUMMARY NOTE:  Orientation/Cognitive: AOx4  Observation Goals (Met/ Not Met): Met   Mobility Level/Assist Equipment: IND   Antibiotics & Plan (IV/po, length of tx left): NA   Pain Management: Denies   Tele/VS/O2: VSS on RA, except low HR (baseline)   ABNL Lab/BG: See results   Diet: Regular   Bowel/Bladder: Continent   Skin Concerns: WNL   Drains/Devices: PIV removed   Patient Stated Goal for Today: Discharged  AVS and medication reviewed with patient, all questions answered. Zio patch to be mailed to home. PIV removed. All belongings gathered. Discharged off the unit to home.

## 2024-02-09 LAB
ATRIAL RATE - MUSE: 56 BPM
DIASTOLIC BLOOD PRESSURE - MUSE: NORMAL MMHG
INTERPRETATION ECG - MUSE: NORMAL
P AXIS - MUSE: 71 DEGREES
PR INTERVAL - MUSE: 202 MS
QRS DURATION - MUSE: 102 MS
QT - MUSE: 496 MS
QTC - MUSE: 478 MS
R AXIS - MUSE: 37 DEGREES
SYSTOLIC BLOOD PRESSURE - MUSE: NORMAL MMHG
T AXIS - MUSE: 79 DEGREES
VENTRICULAR RATE- MUSE: 56 BPM

## 2024-02-12 ENCOUNTER — TELEPHONE (OUTPATIENT)
Dept: CARDIOLOGY | Facility: CLINIC | Age: 79
End: 2024-02-12
Payer: COMMERCIAL

## 2024-02-12 DIAGNOSIS — I48.91 ATRIAL FIBRILLATION WITH RVR (H): Primary | ICD-10-CM

## 2024-02-13 ENCOUNTER — ALLIED HEALTH/NURSE VISIT (OUTPATIENT)
Dept: CARDIOLOGY | Facility: CLINIC | Age: 79
End: 2024-02-13
Attending: INTERNAL MEDICINE
Payer: COMMERCIAL

## 2024-02-13 DIAGNOSIS — I48.91 ATRIAL FIBRILLATION WITH RVR (H): ICD-10-CM

## 2024-02-13 PROCEDURE — 93242 EXT ECG>48HR<7D RECORDING: CPT

## 2024-02-13 NOTE — PROGRESS NOTES
Itz Jaimes arrived here on 2/13/2024 9:42 AM for 3-7 Days  Zio monitor placement per ordering provider Dr. Parisi for the diagnosis 148.91.  Patient s skin was prepped per protocol.  Zio monitor was placed.  Instructions were reviewed with and given to the patient.  Patient verbalized understanding of wear, troubleshooting and monitor return instructions. JANAE Suero

## 2024-03-04 PROCEDURE — 93244 EXT ECG>48HR<7D REV&INTERPJ: CPT | Performed by: INTERNAL MEDICINE

## 2024-03-08 ENCOUNTER — OFFICE VISIT (OUTPATIENT)
Dept: CARDIOLOGY | Facility: CLINIC | Age: 79
End: 2024-03-08
Attending: PHYSICIAN ASSISTANT
Payer: COMMERCIAL

## 2024-03-08 ENCOUNTER — LAB (OUTPATIENT)
Dept: LAB | Facility: CLINIC | Age: 79
End: 2024-03-08
Payer: COMMERCIAL

## 2024-03-08 VITALS
HEIGHT: 73 IN | OXYGEN SATURATION: 94 % | HEART RATE: 54 BPM | BODY MASS INDEX: 28.22 KG/M2 | WEIGHT: 212.9 LBS | SYSTOLIC BLOOD PRESSURE: 140 MMHG | DIASTOLIC BLOOD PRESSURE: 70 MMHG

## 2024-03-08 DIAGNOSIS — I48.91 ATRIAL FIBRILLATION WITH RVR (H): ICD-10-CM

## 2024-03-08 LAB
ANION GAP SERPL CALCULATED.3IONS-SCNC: 10 MMOL/L (ref 7–15)
BUN SERPL-MCNC: 16.4 MG/DL (ref 8–23)
CALCIUM SERPL-MCNC: 9.5 MG/DL (ref 8.8–10.2)
CHLORIDE SERPL-SCNC: 103 MMOL/L (ref 98–107)
CREAT SERPL-MCNC: 1.18 MG/DL (ref 0.67–1.17)
DEPRECATED HCO3 PLAS-SCNC: 26 MMOL/L (ref 22–29)
EGFRCR SERPLBLD CKD-EPI 2021: 63 ML/MIN/1.73M2
ERYTHROCYTE [DISTWIDTH] IN BLOOD BY AUTOMATED COUNT: 13 % (ref 10–15)
GLUCOSE SERPL-MCNC: 97 MG/DL (ref 70–99)
HCT VFR BLD AUTO: 40.1 % (ref 40–53)
HGB BLD-MCNC: 13.7 G/DL (ref 13.3–17.7)
MAGNESIUM SERPL-MCNC: 1.7 MG/DL (ref 1.7–2.3)
MCH RBC QN AUTO: 31.9 PG (ref 26.5–33)
MCHC RBC AUTO-ENTMCNC: 34.2 G/DL (ref 31.5–36.5)
MCV RBC AUTO: 93 FL (ref 78–100)
PLATELET # BLD AUTO: 169 10E3/UL (ref 150–450)
POTASSIUM SERPL-SCNC: 4.1 MMOL/L (ref 3.4–5.3)
RBC # BLD AUTO: 4.3 10E6/UL (ref 4.4–5.9)
SODIUM SERPL-SCNC: 139 MMOL/L (ref 135–145)
WBC # BLD AUTO: 5 10E3/UL (ref 4–11)

## 2024-03-08 PROCEDURE — 80048 BASIC METABOLIC PNL TOTAL CA: CPT | Performed by: PHYSICIAN ASSISTANT

## 2024-03-08 PROCEDURE — 83735 ASSAY OF MAGNESIUM: CPT | Performed by: PHYSICIAN ASSISTANT

## 2024-03-08 PROCEDURE — 93000 ELECTROCARDIOGRAM COMPLETE: CPT | Performed by: PHYSICIAN ASSISTANT

## 2024-03-08 PROCEDURE — 99205 OFFICE O/P NEW HI 60 MIN: CPT | Performed by: PHYSICIAN ASSISTANT

## 2024-03-08 PROCEDURE — 85027 COMPLETE CBC AUTOMATED: CPT | Performed by: PHYSICIAN ASSISTANT

## 2024-03-08 PROCEDURE — 36415 COLL VENOUS BLD VENIPUNCTURE: CPT | Performed by: PHYSICIAN ASSISTANT

## 2024-03-08 NOTE — LETTER
3/8/2024    Bony Avila MD  [address] [facility] [address]    RE: Itz Jaimes       Dear Colleague,     I had the pleasure of seeing Itz Jaimes in the [facility].    Electrophysiology Clinic Progress Note    Itz Jaimes MRN# [id number]   YOB: 1945 Age: 79 year old     Primary cardiology team: Dr. Parisi         Assessment and Plan     In summary, Itz Jaimes presents today for a hospital follow up visit for symptomatic PAF, likely since September of last year per his smart watch.  He has had no AF sxs since discharge with a stable QRS on flecainide 50 mg BID.  However, he does also have significant bradycardia (average heart rate of 49 bpm) on Zio monitor. He develops bothersome fatigue with this which hasn't improved since the reduction of his propranolol a few weeks ago. He is very fearful of cutting this back any further due to his limiting tremor. He would be in favor of permanent pacemaker implantation.     Plan:  I will review dual chamber pacemaker implantation with Dr. Parisi and if she is in agreement, will get him scheduled.   We discussed the risks, benefits and indications of permanent pacemaker implantation, including but not limited to use of conscious sedation including need for a , discomfort, peripheral vessel injury, pneumothorax, cardiac puncture and/or tamponade, device malfunction and/or recall, and infection requiring explantation of the device and long term antibiotics. We also briefly discussed follow up expectations and restrictions following the procedure. The patient voiced understanding and is willing to proceed.  A consent form will be signed by the procedural physician.    I assume we will hold Eliquis x 48h prior to procedure - will confirm this also with Dr. Parisi.   CBC today, after being started on anticoagulation.   BMP/mag level.  Low magnesium felt to be a possible contributor to atrial fibrillation in  the hospital. Would like to keep this >2.  BP mildly elevated in clinic - he will follow up with PCP for this (has upcoming annual visit).     SUSANNE Chen [facility]         History of Presenting Illness     Itz Jaimes is a pleasant 79 year old patient with a pertinent history of the following -   PAF.  Vertebrobasilar artery syndrome  CKD  Essential tremor, on propranolol  HLP  HTN  Hypothyroidism  GERD    Pt presented to the hospital beginning of February 2024 in Afib with rates up to 140's. He was given IV diltiazem in the ED and converted spontaneously to sinus rhythm / sinus bradycardia. He was already taking relatively high doses of propranolol for essential tremors and typically has a resting heart rate in the 40-50's range, which he is asymptomatic from. Echo showed EF 55%. Flecainide 50 mg BID was initiated along with Eliquis 5 mg BID for a [id number] of 4. He was on DAPT for vertebrovascular occlusion and plavix was stopped. Of note, hypomagnesemia was felt to be a possible contributor. 7d zio monitor was placed. This showed SR with an average HR of 49 bpm ().     Today, I am meeting Itz who is very pleasant.  Since leaving the hospital, patient denies chest pain, shortness of breath, PND, orthopnea, edema, claudication, palpitations, near syncope or syncope. However he can get quite fatigued with slow HR's. Noted one daytime reading of 39 and felt pretty crummy with it about a week ago. He used to take propranolol 160 BID with a mid-day dose of 80 mg. His mid-day dose was stopped Feb 15th, so after he took off his monitor. He doesn't think his fatigue has changed since then, and his tremor may have worsened only slightly. However, he is VERY fearful of cutting back further due to how debilitating his tremor can be on a daily basis. Still able to be quite active - is currently taking care of his wife who recently had back surgery, and bowls routinely.  "R-handed bowler.  Looking back at his smart watch recordings, he can see probable AF starting back in September of last year. Back then he could get up to 160 bpm. Resting HR's have always been as low as the upper 40's. No AF sxs but a couple HR spikes (up to 130's) noted on his watch since starting the flecainide.    EKG today shows Sinus bradycardia at 52 bpm, with a QRS of 108 ms.  QRS in the hospital (2/2024 EKG) was 102 ms.  BP initially elevated in clinic, a little better at 140/70 on my re-check.         Review of Systems     12-pt ROS is negative except for as noted in the HPI.          Physical Exam     Vitals: BP (!) 140/70   Pulse 54   Ht 1.854 m (6' 1\")   Wt 96.6 kg (212 lb 14.4 oz)   SpO2 94%   BMI 28.09 kg/m    Wt Readings from Last 10 Encounters:   03/08/24 96.6 kg (212 lb 14.4 oz)   02/07/24 96.2 kg (212 lb 1.3 oz)   04/16/07 96.3 kg (212 lb 3.2 oz)       Constitutional:  Patient is pleasant, alert, cooperative, and in NAD.  HEENT:  NCAT. PERRLA. EOM's intact.   Neck:  CVP appears normal. No carotid bruits.   Pulmonary: Normal respiratory effort. CTAB.   Cardiac: RRR, normal S1/S2, no S3/S4, no murmur or rub.   Abdomen:  Non-tender abdomen, no hepatosplenomegaly appreciated.   Vascular: Pulses in the upper and lower extremities are 2+ and equal bilaterally.  Extremities: No edema, erythema, cyanosis or tenderness appreciated.  Skin:  No rashes or lesions appreciated.   Neurological:  No gross motor or sensory deficits.   Psych: Appropriate affect.          Data   Labs reviewed:  Recent Labs   Lab Test 02/06/24  1920   TSH 3.50       Lab Results   Component Value Date    WBC 5.4 02/06/2024    WBC 8.5 08/29/2008    RBC 4.38 (L) 02/06/2024    RBC 3.12 (L) 08/29/2008    HGB 14.0 02/06/2024    HGB 9.4 (L) 08/29/2008    HCT 40.4 02/06/2024    HCT 27.9 (L) 08/29/2008    MCV 92 02/06/2024    MCV 89 08/29/2008    MCH 32.0 02/06/2024    MCH 30.1 08/29/2008    MCHC 34.7 02/06/2024    MCHC 33.7 08/29/2008 " "   RDW 12.9 02/06/2024    RDW 13.0 08/29/2008     02/06/2024     08/29/2008       Lab Results   Component Value Date     02/06/2024     08/29/2008    POTASSIUM 3.9 02/06/2024    POTASSIUM 3.6 08/29/2008    CHLORIDE 100 02/06/2024    CHLORIDE 103 08/29/2008    CO2 30 (H) 02/06/2024    CO2 27 08/29/2008    ANIONGAP 8 02/06/2024    ANIONGAP 7.2 08/29/2008     (H) 02/06/2024     (H) 08/29/2008    BUN 19.2 02/06/2024    BUN 11 08/29/2008    CR 1.11 02/06/2024    CR 1.00 08/29/2008    GFRESTIMATED 68 02/06/2024    GFRESTIMATED 75 08/29/2008    GFRESTBLACK >90 08/29/2008    LORRIE 9.2 02/06/2024    LORRIE 7.9 (L) 08/29/2008      No results found for: \"AST\", \"ALT\"    No results found for: \"A1C\"    No results found for: \"INR\"         Problem List     Patient Active Problem List   Diagnosis    Atrial fibrillation with RVR (H)            Medications     Current Outpatient Medications   Medication Sig Dispense Refill    apixaban ANTICOAGULANT (ELIQUIS) 5 MG tablet Take 1 tablet (5 mg) by mouth 2 times daily 60 tablet 1    aspirin 81 MG EC tablet Take 81 mg by mouth daily      calcium carbonate (OS-LORRIE) 1500 (600 Ca) MG tablet Take 600 mg by mouth 2 times daily At 1200/2000      flecainide (TAMBOCOR) 50 MG tablet Take 1 tablet (50 mg) by mouth every 12 hours 60 tablet 1    levothyroxine (SYNTHROID/LEVOTHROID) 112 MCG tablet Take 112 mcg by mouth daily      loratadine (CLARITIN) 10 MG tablet Take 10 mg by mouth daily      omega 3 1000 MG CAPS Take 1,000 mg by mouth 2 times daily At AM, 2000      omeprazole (PRILOSEC) 20 MG DR capsule Take 20 mg by mouth 2 times daily At 1200, 2000      primidone (MYSOLINE) 50 MG tablet Take 150 mg by mouth 2 times daily At AM, 2000      propranolol ER (INDERAL LA) 160 MG 24 hr capsule Take 160 mg by mouth 2 times daily At AM, 1600      simvastatin (ZOCOR) 40 MG tablet Take 40 mg by mouth daily      vitamin D3 (CHOLECALCIFEROL) 50 mcg (2000 units) tablet Take 1 " tablet by mouth daily              Past Medical History     Past Medical History:   Diagnosis Date    Chronic kidney disease, stage 2 (mild)     Degeneration of lumbar or lumbosacral intervertebral disc     Essential tremor     GERD (gastroesophageal reflux disease)     Hyperlipidemia     Hypertension     Hypothyroidism     Vertebrobasilar artery syndrome     Vitamin D deficiency      Past Surgical History:   Procedure Laterality Date    LUMBAR FUSION      L3-S1    REPLACEMENT TOTAL KNEE Bilateral     ROTATOR CUFF REPAIR RT/LT       Family History   Problem Relation Age of Onset    Hypertension Mother     Prostate Cancer Father     Hypertension Sister      Social History     Socioeconomic History    Marital status:      Spouse name: Not on file    Number of children: Not on file    Years of education: Not on file    Highest education level: Not on file   Occupational History    Not on file   Tobacco Use    Smoking status: Never    Smokeless tobacco: Never   Substance and Sexual Activity    Alcohol use: Yes     Comment: Occasional    Drug use: Not on file    Sexual activity: Not on file   Other Topics Concern    Not on file   Social History Narrative    Not on file     Social Determinants of Health     Financial Resource Strain: Not on file   Food Insecurity: Not on file   Transportation Needs: Not on file   Physical Activity: Not on file   Stress: Not on file   Social Connections: Not on file   Interpersonal Safety: Not on file   Housing Stability: Not on file            Allergies   Patient has no known allergies.    Today's clinic visit entailed:  Review of the result(s) of each unique test - EKG, echo, BMP, CBC, mag, smart watch readings  Prescription drug management  I spent a total of 60 minutes on the day of the visit.   Time spent by me doing chart review, history and exam, documentation and further activities per the note  Provider  Link to [facility] Help Grid     The level of medical decision making during  this visit was of high complexity.      Thank you for allowing me to participate in the care of your patient.      Sincerely,     Nathaly Looney PA-C     [facility]  cc:   Tawnya Winston PA-C  [address] [place] [id number]

## 2024-03-08 NOTE — PROGRESS NOTES
Electrophysiology Clinic Progress Note    Itz Jaimes MRN# [id number]   YOB: 1945 Age: 79 year old     Primary cardiology team: Dr. Parisi         Assessment and Plan     In summary, Itz Jaimes presents today for a hospital follow up visit for symptomatic PAF, likely since September of last year per his smart watch.  He has had no AF sxs since discharge with a stable QRS on flecainide 50 mg BID.  However, he does also have significant bradycardia (average heart rate of 49 bpm) on Zio monitor. He develops bothersome fatigue with this which hasn't improved since the reduction of his propranolol a few weeks ago. He is very fearful of cutting this back any further due to his limiting tremor. He would be in favor of permanent pacemaker implantation.     Plan:  I will review dual chamber pacemaker implantation with Dr. Parisi and if she is in agreement, will get him scheduled.   We discussed the risks, benefits and indications of permanent pacemaker implantation, including but not limited to use of conscious sedation including need for a , discomfort, peripheral vessel injury, pneumothorax, cardiac puncture and/or tamponade, device malfunction and/or recall, and infection requiring explantation of the device and long term antibiotics. We also briefly discussed follow up expectations and restrictions following the procedure. The patient voiced understanding and is willing to proceed.  A consent form will be signed by the procedural physician.    I assume we will hold Eliquis x 48h prior to procedure - will confirm this also with Dr. Parisi.   CBC today, after being started on anticoagulation.   BMP/mag level.  Low magnesium felt to be a possible contributor to atrial fibrillation in the hospital. Would like to keep this >2.  BP mildly elevated in clinic - he will follow up with PCP for this (has upcoming annual visit).     Nathaly Looney PA-C  [facility] - [facility]          History of Presenting Illness     Itz Jaimes is a pleasant 79 year old patient with a pertinent history of the following -   PAF.  Vertebrobasilar artery syndrome  CKD  Essential tremor, on propranolol  HLP  HTN  Hypothyroidism  GERD    Pt presented to the hospital beginning of February 2024 in Afib with rates up to 140's. He was given IV diltiazem in the ED and converted spontaneously to sinus rhythm / sinus bradycardia. He was already taking relatively high doses of propranolol for essential tremors and typically has a resting heart rate in the 40-50's range, which he is asymptomatic from. Echo showed EF 55%. Flecainide 50 mg BID was initiated along with Eliquis 5 mg BID for a [id number] of 4. He was on DAPT for vertebrovascular occlusion and plavix was stopped. Of note, hypomagnesemia was felt to be a possible contributor. 7d zio monitor was placed. This showed SR with an average HR of 49 bpm ().     Today, I am meeting Itz who is very pleasant.  Since leaving the hospital, patient denies chest pain, shortness of breath, PND, orthopnea, edema, claudication, palpitations, near syncope or syncope. However he can get quite fatigued with slow HR's. Noted one daytime reading of 39 and felt pretty crummy with it about a week ago. He used to take propranolol 160 BID with a mid-day dose of 80 mg. His mid-day dose was stopped Feb 15th, so after he took off his monitor. He doesn't think his fatigue has changed since then, and his tremor may have worsened only slightly. However, he is VERY fearful of cutting back further due to how debilitating his tremor can be on a daily basis. Still able to be quite active - is currently taking care of his wife who recently had back surgery, and bowls routinely. R-handed bowler.  Looking back at his smart watch recordings, he can see probable AF starting back in September of last year. Back then he could get up to 160 bpm. Resting HR's have always been as low as the  "upper 40's. No AF sxs but a couple HR spikes (up to 130's) noted on his watch since starting the flecainide.    EKG today shows Sinus bradycardia at 52 bpm, with a QRS of 108 ms.  QRS in the hospital (2/2024 EKG) was 102 ms.  BP initially elevated in clinic, a little better at 140/70 on my re-check.         Review of Systems     12-pt ROS is negative except for as noted in the HPI.          Physical Exam     Vitals: BP (!) 140/70   Pulse 54   Ht 1.854 m (6' 1\")   Wt 96.6 kg (212 lb 14.4 oz)   SpO2 94%   BMI 28.09 kg/m    Wt Readings from Last 10 Encounters:   03/08/24 96.6 kg (212 lb 14.4 oz)   02/07/24 96.2 kg (212 lb 1.3 oz)   04/16/07 96.3 kg (212 lb 3.2 oz)       Constitutional:  Patient is pleasant, alert, cooperative, and in NAD.  HEENT:  NCAT. PERRLA. EOM's intact.   Neck:  CVP appears normal. No carotid bruits.   Pulmonary: Normal respiratory effort. CTAB.   Cardiac: RRR, normal S1/S2, no S3/S4, no murmur or rub.   Abdomen:  Non-tender abdomen, no hepatosplenomegaly appreciated.   Vascular: Pulses in the upper and lower extremities are 2+ and equal bilaterally.  Extremities: No edema, erythema, cyanosis or tenderness appreciated.  Skin:  No rashes or lesions appreciated.   Neurological:  No gross motor or sensory deficits.   Psych: Appropriate affect.          Data   Labs reviewed:  Recent Labs   Lab Test 02/06/24  1920   TSH 3.50       Lab Results   Component Value Date    WBC 5.4 02/06/2024    WBC 8.5 08/29/2008    RBC 4.38 (L) 02/06/2024    RBC 3.12 (L) 08/29/2008    HGB 14.0 02/06/2024    HGB 9.4 (L) 08/29/2008    HCT 40.4 02/06/2024    HCT 27.9 (L) 08/29/2008    MCV 92 02/06/2024    MCV 89 08/29/2008    MCH 32.0 02/06/2024    MCH 30.1 08/29/2008    MCHC 34.7 02/06/2024    MCHC 33.7 08/29/2008    RDW 12.9 02/06/2024    RDW 13.0 08/29/2008     02/06/2024     08/29/2008       Lab Results   Component Value Date     02/06/2024     08/29/2008    POTASSIUM 3.9 02/06/2024    " "POTASSIUM 3.6 08/29/2008    CHLORIDE 100 02/06/2024    CHLORIDE 103 08/29/2008    CO2 30 (H) 02/06/2024    CO2 27 08/29/2008    ANIONGAP 8 02/06/2024    ANIONGAP 7.2 08/29/2008     (H) 02/06/2024     (H) 08/29/2008    BUN 19.2 02/06/2024    BUN 11 08/29/2008    CR 1.11 02/06/2024    CR 1.00 08/29/2008    GFRESTIMATED 68 02/06/2024    GFRESTIMATED 75 08/29/2008    GFRESTBLACK >90 08/29/2008    LORRIE 9.2 02/06/2024    LORRIE 7.9 (L) 08/29/2008      No results found for: \"AST\", \"ALT\"    No results found for: \"A1C\"    No results found for: \"INR\"         Problem List     Patient Active Problem List   Diagnosis    Atrial fibrillation with RVR (H)            Medications     Current Outpatient Medications   Medication Sig Dispense Refill    apixaban ANTICOAGULANT (ELIQUIS) 5 MG tablet Take 1 tablet (5 mg) by mouth 2 times daily 60 tablet 1    aspirin 81 MG EC tablet Take 81 mg by mouth daily      calcium carbonate (OS-LORRIE) 1500 (600 Ca) MG tablet Take 600 mg by mouth 2 times daily At 1200/2000      flecainide (TAMBOCOR) 50 MG tablet Take 1 tablet (50 mg) by mouth every 12 hours 60 tablet 1    levothyroxine (SYNTHROID/LEVOTHROID) 112 MCG tablet Take 112 mcg by mouth daily      loratadine (CLARITIN) 10 MG tablet Take 10 mg by mouth daily      omega 3 1000 MG CAPS Take 1,000 mg by mouth 2 times daily At AM, 2000      omeprazole (PRILOSEC) 20 MG DR capsule Take 20 mg by mouth 2 times daily At 1200, 2000      primidone (MYSOLINE) 50 MG tablet Take 150 mg by mouth 2 times daily At AM, 2000      propranolol ER (INDERAL LA) 160 MG 24 hr capsule Take 160 mg by mouth 2 times daily At AM, 1600      simvastatin (ZOCOR) 40 MG tablet Take 40 mg by mouth daily      vitamin D3 (CHOLECALCIFEROL) 50 mcg (2000 units) tablet Take 1 tablet by mouth daily              Past Medical History     Past Medical History:   Diagnosis Date    Chronic kidney disease, stage 2 (mild)     Degeneration of lumbar or lumbosacral intervertebral disc     " Essential tremor     GERD (gastroesophageal reflux disease)     Hyperlipidemia     Hypertension     Hypothyroidism     Vertebrobasilar artery syndrome     Vitamin D deficiency      Past Surgical History:   Procedure Laterality Date    LUMBAR FUSION      L3-S1    REPLACEMENT TOTAL KNEE Bilateral     ROTATOR CUFF REPAIR RT/LT       Family History   Problem Relation Age of Onset    Hypertension Mother     Prostate Cancer Father     Hypertension Sister      Social History     Socioeconomic History    Marital status:      Spouse name: Not on file    Number of children: Not on file    Years of education: Not on file    Highest education level: Not on file   Occupational History    Not on file   Tobacco Use    Smoking status: Never    Smokeless tobacco: Never   Substance and Sexual Activity    Alcohol use: Yes     Comment: Occasional    Drug use: Not on file    Sexual activity: Not on file   Other Topics Concern    Not on file   Social History Narrative    Not on file     Social Determinants of Health     Financial Resource Strain: Not on file   Food Insecurity: Not on file   Transportation Needs: Not on file   Physical Activity: Not on file   Stress: Not on file   Social Connections: Not on file   Interpersonal Safety: Not on file   Housing Stability: Not on file            Allergies   Patient has no known allergies.    Today's clinic visit entailed:  Review of the result(s) of each unique test - EKG, echo, BMP, CBC, mag, smart watch readings  Prescription drug management  I spent a total of 60 minutes on the day of the visit.   Time spent by me doing chart review, history and exam, documentation and further activities per the note  Provider  Link to MDM Help Grid     The level of medical decision making during this visit was of high complexity.

## 2024-03-11 ENCOUNTER — TELEPHONE (OUTPATIENT)
Dept: CARDIOLOGY | Facility: CLINIC | Age: 79
End: 2024-03-11
Payer: COMMERCIAL

## 2024-03-11 DIAGNOSIS — I48.91 ATRIAL FIBRILLATION WITH RVR (H): ICD-10-CM

## 2024-03-11 DIAGNOSIS — I48.0 PAROXYSMAL ATRIAL FIBRILLATION (H): Primary | ICD-10-CM

## 2024-03-11 RX ORDER — ELECTROLYTES/DEXTROSE
64 SOLUTION, ORAL ORAL DAILY
Qty: 180 TABLET | Refills: 3 | Status: SHIPPED | OUTPATIENT
Start: 2024-03-11

## 2024-03-11 RX ORDER — FLECAINIDE ACETATE 50 MG/1
50 TABLET ORAL EVERY 12 HOURS
Qty: 180 TABLET | Refills: 3 | Status: SHIPPED | OUTPATIENT
Start: 2024-03-11

## 2024-03-11 NOTE — TELEPHONE ENCOUNTER
Patient walked into clinic as he needed refills on his flecainide and eliquis and wanted them to be sent to [facility].  Informed patient that refills would be sent.  Also reviewed with patient lab results and recommendations per EVERTON Mccloud:  Creatinine essentially stable, magnesium level borderline-low. Please have him start magnesium chloride 64 mg two tabs daily and re-check mag level in 2 weeks.   Medication list updated in epic and script sent to [email or web address].  Repeat lab scheduled for 3/29 at 9:15am in [place].  Patient provided verbal understanding regarding above.  NOLAN Vargas

## 2024-03-11 NOTE — TELEPHONE ENCOUNTER
Left message for patient to review lab results and recommendations per EVERTON Mccloud see below.   Creatinine essentially stable, magnesium level borderline-low. Please have him start magnesium chloride 64 mg two tabs daily and re-check mag level in 2 weeks.  Waiting call back from patient.  NOLAN Vargas

## 2024-03-12 DIAGNOSIS — I48.0 PAROXYSMAL ATRIAL FIBRILLATION (H): Primary | ICD-10-CM

## 2024-03-12 DIAGNOSIS — I49.5 SICK SINUS SYNDROME (H): ICD-10-CM

## 2024-03-12 NOTE — PROGRESS NOTES
Pt left VM, he said he missed a call earlier today.     Per staff message review:  Farhad Parisi MD Davis, Nathaly Denis PA-C; SUSAN Wallis [facility] Heart Ep Nurse  Yes, pacemaker is very reasonable.  Please go ahead and schedule it.  He can hold Eliquis the day of the procedure (or night prior if it is first case in the morning)  Thanks!      Called pt back. Let him know above message from Dr. Parisi. Pt said he got the VM so he is already aware. I let him know scheduling will be calling him either today or tomorrow.

## 2024-03-12 NOTE — PROGRESS NOTES
Called and left  for pt . Informed pt PPM was approved by Dr Parisi and scheduling would reach out and schedule this with him. Phone number for device provided if he has questions.  Gwendolyn FRANCISCO

## 2024-03-12 NOTE — PROGRESS NOTES
Device Clinic pre-procedure medication holds/changes:    Anticoagulation: Eliquis  Hold day of procedure unless early case then hold evening before dose also   [id number] Score: 3  -INR check needed?: na    Oral diabetes meds: no  Insulin: no    SGLT2 Inhibitors: no  - Invokana (canagliflozin), Farxiga (dapagliflozin), Jardiance (empagliflozin), Steglatro (ertugliflozin), Synjardy (empagliflozin/metformin)  - hold 3-4 days prior to procedure    GLP-1 Agonists: no  - Byetta (exenitide), Victoza (liraglutide), Ozempic, Wegovy, Rybelsus (semaglutide), Trulicity (dulaglutide), Mounjaro (tirzepatide), Bydureon (Exenatide ER), Adlyxin (Lixisendatide)   - (Weekly dosing, hold GLP-1 agonists 7 days before procedure)  - (Daily or BID dosing, hold GLP-1 agonists day before and day of procedure)  - (Oral semaglutide, hold 7 days before procedure due to long half-life)    Diuretic: no    Contrast allergy: no        Checklist:  - entered orders for H&P and procedure  /  - entered orders for device checks post procedure/  - cancelled any old device check appts and orders/  - sent message to scheduling (Jia) to set up appointments (H&P, procedure, 1 week check) /

## 2024-03-13 ENCOUNTER — TELEPHONE (OUTPATIENT)
Dept: CARDIOLOGY | Facility: CLINIC | Age: 79
End: 2024-03-13
Payer: COMMERCIAL

## 2024-03-13 DIAGNOSIS — I49.5 SICK SINUS SYNDROME (H): Primary | ICD-10-CM

## 2024-03-13 DIAGNOSIS — I48.91 ATRIAL FIBRILLATION WITH RVR (H): ICD-10-CM

## 2024-03-13 DIAGNOSIS — I49.5 SSS (SICK SINUS SYNDROME) (H): ICD-10-CM

## 2024-03-13 RX ORDER — SODIUM CHLORIDE 450 MG/100ML
INJECTION, SOLUTION INTRAVENOUS CONTINUOUS
Status: CANCELLED | OUTPATIENT
Start: 2024-03-13

## 2024-03-13 RX ORDER — CEFAZOLIN SODIUM 2 G/100ML
2 INJECTION, SOLUTION INTRAVENOUS
Status: CANCELLED | OUTPATIENT
Start: 2024-03-13

## 2024-03-13 RX ORDER — LIDOCAINE 40 MG/G
CREAM TOPICAL
Status: CANCELLED | OUTPATIENT
Start: 2024-03-13

## 2024-03-13 NOTE — PROGRESS NOTES
Pre-procedure instructions for PPM implant on 03/20/2024 at 12:00    -Review arrival time of 10:00   [facility] in [place], [facility]  [place] [address]  Park in [facility] (west of [address]), walk across the sk[address], stay on the 2nd floor, heart clinic is to the left of the large staircase just past the elevators       Anticoagulation: Eliquis - Hold evening dose 3/19/2024  and am dose 3/20/2024 of Eliquis.  Oral diabetes meds: no  Insulin: no  SGLT2 Inhibitors: no  - Invokana (canagliflozin), Farxiga (dapagliflozin), Jardiance (empagliflozin), Steglatro (ertugliflozin), Synjardy (empagliflozin/metformin)  - hold 3-4 days prior to procedure  GLP-1 Agonists: no  - Byetta (exenitide), Victoza (liraglutide), Ozempic (semaglutide), Trulicity (dulaglutide), Mounjaro (tirzepatide), Bydureon (Exenatide ER), Adlyxin (Lixisendatide)   - (Weekly dosing, hold GLP-1 agonists 7 days before procedure)  - (Daily or BID dosing, hold GLP-1 agonists day before and day of procedure)  - (Oral semaglutide, hold 7 days before procedure due to long half-life)  Diuretic: no    NPO 8 hours prior to arrival time (starting at 2 am)  May have clear liquids 2 hours prior to arrival time (up until 8am)    -Shower the morning of the procedure, and then put on a clean shirt in order to help prevent infection.     -Take temperature the morning of the procedure and call [facility] at [phone] if it is above 100.0.  Also call [facility] if pt has any new cold symptoms evening prior or AM of procedure.     -Post-procedure transportation and 24 hours monitoring set up. Please call before coming in if plans change.  With limited bed availability due to COVID, overnight hospital stays will be allowed for clinical reasons only.    -No driving for 24 hours post procedure due to sedation.     MRSA history?: no  Allergy to PCN or ancef?: no. Reaction: no  Contrast allergy?: no      Pt verbalized understanding of instructions.    Xavieriedtevin RN

## 2024-03-22 NOTE — PROGRESS NOTES
Orders already in per orderset for 3/29/24. Unable to enter new.  Pt rescheduled from 3/20/24. Updated note and recalled pt.   Pre-procedure instructions for PPM implant on 03/20/2024 at 1330     -Review arrival time of 1130  [facility] in [place], [facility] [place] [address] [place] [address]  Park in [facility] (west of [facility]), walk across the skyway, stay on the 2nd floor, heart clinic is to the left of the large staircase just past the elevators        Anticoagulation: Eliquis - Hold evening dose 3/28/2024,  and am dose 3/29/2024 of Eliquis.  Oral diabetes meds: no  Insulin: no  SGLT2 Inhibitors: no  - Invokana (canagliflozin), Farxiga (dapagliflozin), Jardiance (empagliflozin), Steglatro (ertugliflozin), Synjardy (empagliflozin/metformin)  - hold 3-4 days prior to procedure  GLP-1 Agonists: no  - Byetta (exenitide), Victoza (liraglutide), Ozempic (semaglutide), Trulicity (dulaglutide), Mounjaro (tirzepatide), Bydureon (Exenatide ER), Adlyxin (Lixisendatide)   - (Weekly dosing, hold GLP-1 agonists 7 days before procedure)  - (Daily or BID dosing, hold GLP-1 agonists day before and day of procedure)  - (Oral semaglutide, hold 7 days before procedure due to long half-life)  Diuretic: no     NPO 8 hours prior to arrival time (starting at 3:30 am)  May have clear liquids 2 hours prior to arrival time (up until 0930 am)     -Shower the morning of the procedure, and then put on a clean shirt in order to help prevent infection.      -Take temperature the morning of the procedure and call [facility] at [phone] if it is above 100.0.  Also call [facility] if pt has any new cold symptoms evening prior or AM of procedure.      -Post-procedure transportation and 24 hours monitoring set up. Please call before coming in if plans change.  With limited bed availability due to COVID, overnight hospital stays will be allowed for clinical reasons only.     -No driving for 24 hours post procedure due to  sedation.      MRSA history?: no  Allergy to PCN or ancef?: no. Reaction: no  Contrast allergy?: no        Pt verbalized understanding of instructions.

## 2024-03-29 ENCOUNTER — HOSPITAL ENCOUNTER (OUTPATIENT)
Facility: CLINIC | Age: 79
Discharge: HOME | End: 2024-03-29
Attending: INTERNAL MEDICINE | Admitting: INTERNAL MEDICINE
Payer: COMMERCIAL

## 2024-03-29 ENCOUNTER — APPOINTMENT (OUTPATIENT)
Dept: GENERAL RADIOLOGY | Facility: CLINIC | Age: 79
End: 2024-03-29
Attending: INTERNAL MEDICINE
Payer: COMMERCIAL

## 2024-03-29 VITALS
DIASTOLIC BLOOD PRESSURE: 86 MMHG | RESPIRATION RATE: 16 BRPM | OXYGEN SATURATION: 98 % | BODY MASS INDEX: 27.91 KG/M2 | HEIGHT: 73 IN | TEMPERATURE: 98 F | WEIGHT: 210.6 LBS | HEART RATE: 60 BPM | SYSTOLIC BLOOD PRESSURE: 109 MMHG

## 2024-03-29 DIAGNOSIS — I49.5 SICK SINUS SYNDROME (H): ICD-10-CM

## 2024-03-29 DIAGNOSIS — I49.5 SSS (SICK SINUS SYNDROME) (H): ICD-10-CM

## 2024-03-29 DIAGNOSIS — I48.91 ATRIAL FIBRILLATION WITH RVR (H): ICD-10-CM

## 2024-03-29 DIAGNOSIS — I48.0 PAROXYSMAL ATRIAL FIBRILLATION (H): ICD-10-CM

## 2024-03-29 LAB
ANION GAP SERPL CALCULATED.3IONS-SCNC: 10 MMOL/L (ref 7–15)
BUN SERPL-MCNC: 16.1 MG/DL (ref 8–23)
CALCIUM SERPL-MCNC: 9 MG/DL (ref 8.8–10.2)
CHLORIDE SERPL-SCNC: 104 MMOL/L (ref 98–107)
CREAT SERPL-MCNC: 1.21 MG/DL (ref 0.67–1.17)
DEPRECATED HCO3 PLAS-SCNC: 26 MMOL/L (ref 22–29)
EGFRCR SERPLBLD CKD-EPI 2021: 61 ML/MIN/1.73M2
ERYTHROCYTE [DISTWIDTH] IN BLOOD BY AUTOMATED COUNT: 12.8 % (ref 10–15)
GLUCOSE SERPL-MCNC: 98 MG/DL (ref 70–99)
HCT VFR BLD AUTO: 40.6 % (ref 40–53)
HGB BLD-MCNC: 13.8 G/DL (ref 13.3–17.7)
MCH RBC QN AUTO: 31.9 PG (ref 26.5–33)
MCHC RBC AUTO-ENTMCNC: 34 G/DL (ref 31.5–36.5)
MCV RBC AUTO: 94 FL (ref 78–100)
PLATELET # BLD AUTO: 170 10E3/UL (ref 150–450)
POTASSIUM SERPL-SCNC: 4.2 MMOL/L (ref 3.4–5.3)
RBC # BLD AUTO: 4.32 10E6/UL (ref 4.4–5.9)
SODIUM SERPL-SCNC: 140 MMOL/L (ref 135–145)
WBC # BLD AUTO: 4.8 10E3/UL (ref 4–11)

## 2024-03-29 PROCEDURE — 36415 COLL VENOUS BLD VENIPUNCTURE: CPT | Performed by: INTERNAL MEDICINE

## 2024-03-29 PROCEDURE — 250N000013 HC RX MED GY IP 250 OP 250 PS 637: Performed by: INTERNAL MEDICINE

## 2024-03-29 PROCEDURE — C1894 INTRO/SHEATH, NON-LASER: HCPCS | Performed by: INTERNAL MEDICINE

## 2024-03-29 PROCEDURE — 33208 INSRT HEART PM ATRIAL & VENT: CPT | Mod: KX | Performed by: INTERNAL MEDICINE

## 2024-03-29 PROCEDURE — 85018 HEMOGLOBIN: CPT | Performed by: INTERNAL MEDICINE

## 2024-03-29 PROCEDURE — 80048 BASIC METABOLIC PNL TOTAL CA: CPT | Performed by: INTERNAL MEDICINE

## 2024-03-29 PROCEDURE — C1785 PMKR, DUAL, RATE-RESP: HCPCS | Performed by: INTERNAL MEDICINE

## 2024-03-29 PROCEDURE — 33208 INSRT HEART PM ATRIAL & VENT: CPT | Performed by: INTERNAL MEDICINE

## 2024-03-29 PROCEDURE — 250N000009 HC RX 250: Performed by: INTERNAL MEDICINE

## 2024-03-29 PROCEDURE — 999N000065 XR CHEST 2 VIEWS

## 2024-03-29 PROCEDURE — C1898 LEAD, PMKR, OTHER THAN TRANS: HCPCS | Performed by: INTERNAL MEDICINE

## 2024-03-29 PROCEDURE — 999N000184 HC STATISTIC TELEMETRY

## 2024-03-29 PROCEDURE — 250N000011 HC RX IP 250 OP 636: Performed by: INTERNAL MEDICINE

## 2024-03-29 PROCEDURE — 272N000001 HC OR GENERAL SUPPLY STERILE: Performed by: INTERNAL MEDICINE

## 2024-03-29 PROCEDURE — 999N000071 HC STATISTIC HEART CATH LAB OR EP LAB

## 2024-03-29 PROCEDURE — 99153 MOD SED SAME PHYS/QHP EA: CPT | Performed by: INTERNAL MEDICINE

## 2024-03-29 PROCEDURE — 999N000054 HC STATISTIC EKG NON-CHARGEABLE

## 2024-03-29 PROCEDURE — 93005 ELECTROCARDIOGRAM TRACING: CPT

## 2024-03-29 PROCEDURE — 99152 MOD SED SAME PHYS/QHP 5/>YRS: CPT | Performed by: INTERNAL MEDICINE

## 2024-03-29 PROCEDURE — 258N000002 HC RX IP 258 OP 250: Performed by: INTERNAL MEDICINE

## 2024-03-29 DEVICE — LEAD INGEVITY+ AF IS1 7841 52CM: Type: IMPLANTABLE DEVICE | Status: FUNCTIONAL

## 2024-03-29 DEVICE — PCMKR CARD ACCOLADE MRI EL DR: Type: IMPLANTABLE DEVICE | Status: FUNCTIONAL

## 2024-03-29 DEVICE — LEAD INGEVITY+ AF IS1 7842 59CM: Type: IMPLANTABLE DEVICE | Status: FUNCTIONAL

## 2024-03-29 RX ORDER — BUPIVACAINE HYDROCHLORIDE 2.5 MG/ML
INJECTION, SOLUTION EPIDURAL; INFILTRATION; INTRACAUDAL
Status: DISCONTINUED | OUTPATIENT
Start: 2024-03-29 | End: 2024-03-29 | Stop reason: HOSPADM

## 2024-03-29 RX ORDER — NALOXONE HYDROCHLORIDE 0.4 MG/ML
0.2 INJECTION, SOLUTION INTRAMUSCULAR; INTRAVENOUS; SUBCUTANEOUS
Status: DISCONTINUED | OUTPATIENT
Start: 2024-03-29 | End: 2024-03-29 | Stop reason: HOSPADM

## 2024-03-29 RX ORDER — FENTANYL CITRATE 50 UG/ML
INJECTION, SOLUTION INTRAMUSCULAR; INTRAVENOUS
Status: DISCONTINUED | OUTPATIENT
Start: 2024-03-29 | End: 2024-03-29 | Stop reason: HOSPADM

## 2024-03-29 RX ORDER — ACETAMINOPHEN 325 MG/1
650 TABLET ORAL ONCE
Status: COMPLETED | OUTPATIENT
Start: 2024-03-29 | End: 2024-03-29

## 2024-03-29 RX ORDER — SODIUM CHLORIDE 450 MG/100ML
INJECTION, SOLUTION INTRAVENOUS CONTINUOUS
Status: DISCONTINUED | OUTPATIENT
Start: 2024-03-29 | End: 2024-03-29 | Stop reason: HOSPADM

## 2024-03-29 RX ORDER — NALOXONE HYDROCHLORIDE 0.4 MG/ML
0.4 INJECTION, SOLUTION INTRAMUSCULAR; INTRAVENOUS; SUBCUTANEOUS
Status: DISCONTINUED | OUTPATIENT
Start: 2024-03-29 | End: 2024-03-29 | Stop reason: HOSPADM

## 2024-03-29 RX ORDER — CEFAZOLIN SODIUM 2 G/100ML
INJECTION, SOLUTION INTRAVENOUS CONTINUOUS PRN
Status: COMPLETED | OUTPATIENT
Start: 2024-03-29 | End: 2024-03-29

## 2024-03-29 RX ORDER — CEFAZOLIN SODIUM 2 G/100ML
2 INJECTION, SOLUTION INTRAVENOUS
Status: DISCONTINUED | OUTPATIENT
Start: 2024-03-29 | End: 2024-03-29 | Stop reason: HOSPADM

## 2024-03-29 RX ORDER — LIDOCAINE 40 MG/G
CREAM TOPICAL
Status: DISCONTINUED | OUTPATIENT
Start: 2024-03-29 | End: 2024-03-29 | Stop reason: HOSPADM

## 2024-03-29 RX ADMIN — SODIUM CHLORIDE: 4.5 INJECTION, SOLUTION INTRAVENOUS at 12:08

## 2024-03-29 RX ADMIN — ACETAMINOPHEN 650 MG: 325 TABLET, FILM COATED ORAL at 15:18

## 2024-03-29 ASSESSMENT — ACTIVITIES OF DAILY LIVING (ADL)
ADLS_ACUITY_SCORE: 35

## 2024-03-29 NOTE — PROGRESS NOTES
[facility] Admission Nursing Note    Patient Information  Name: Itz Jaimes  Age: 79 year old  Reason for admission: PPM implant  [facility] arrival time: 1128    Visitor Information  Name: Ankita     Patient Admission/Assessment   Pre-procedure assessment complete: Yes  If abnormal assessment/labs, provider notified: No.   NPO: Yes  Medications held per instructions/orders: Yes  Consent: obtained  Patient oriented to room: Yes  Education/questions answered: Yes  Plan/other: Proceed as scheduled    Discharge Planning  Discharge name/phone number: Ankita [phone]  Overnight post sedation caregiver: Ankita  Discharge location: home    Katherin Toure RN

## 2024-03-29 NOTE — DISCHARGE INSTRUCTIONS
Pacemaker Implant Discharge Instructions     After you go home:    Have an adult stay with you until tomorrow.  You may resume your normal diet.       For 24 hours - due to the sedation you received:  Relax and take it easy.  Do NOT make any important or legal decisions.  Do NOT drive or operate machines at home or at work.  Do NOT drink alcohol.    Care of Chest Incision:    Keep the bandage on at least 3 days. You may remove the dressing on 4/1 afternoon. Change it only if it gets loose or soaked. If you need to change it, use 4x4-inch gauze and a large clear bandage.   If there is a pressure dressing (gauze & tape) - 24 hours after your procedure you may remove ONLY the top dressing. Leave the bottom dressing on.  Leave the strips of tape on. They will fall off on their own, or we will remove them at your first check-up.  Check your wound daily for signs of infection, such as increased redness, severe swelling or draining. Fever may also be a sign of infection. Call us if you see any of these signs.  If there are no signs of infection, you may shower after the bandage comes off in 3 days. If you take a tub bath, keep the wound dry.  No soaking the incision (swimming pool, bathtub, hot tub) for 2 weeks.  You may have mild to medium pain for 3 to 5 days. Take Acetaminophen (Tylenol) or Ibuprofen (Advil) for the pain. If the pain persists or is severe, call us.    Activity:    For at least 2 weeks: Do not raise your elbow above your shoulder. You can begin to use your arm as it feels comfortable to you.  Do not use arm on implant side to lift more than 10 pounds for 2 weeks.  In 6 to 8 weeks: You may begin to golf, play tennis, swim and do similar activities.  No driving for one day & limit to necessary driving for one week.    Bleeding:    If you start bleeding from the incision site, sit down and press firmly on the site for 10 minutes.   Once bleeding stops, call [facility] as soon as you can.       Call  911 right away if you have heavy bleeding or bleeding that does not stop.      Medicines:    Take your medications, including blood thinners, unless your provider tells you not to.  If you have stopped any medicines, check with your provider about when to restart them.    Follow Up Appointments:    Follow up with Device Clinic at [facility] of patient preference in 7-10 days.    Call the clinic if:    You have a large or growing hard lump around the site.  The site is red, swollen, hot or tender.  Blood or fluid is draining from the site.  You have chills or a fever greater than 101 F (38 C).  You feel dizzy or light-headed.  Questions or concerns    Telling others about your device:    Before you leave the hospital, you will receive a temporary ID card. A permanent card will be mailed to you about 6 to 8 weeks later. Always carry the ID card with you. It has important details about your device.  You may also get a Medical Alert bracelet or tag that says you have a pacemaker.  Go to [email or web address].   Always tell doctors, dentists and other care providers that you have a device implanted in you.  Let us know before you plan any surgeries. Your care team must take special steps to keep you safe during certain procedures. These steps will depend on the type of device you have. Your provider will need to see your ID card. They may need to call us for instructions.    Device Safety:    Please refer to device  s booklet for further information.        [facility] [place]:    [phone] [facility] (7 days a week)

## 2024-03-29 NOTE — PROVIDER NOTIFICATION
Rn paged provider for patient's question regarding resuming Eliquis. Patient's x-ray is also complete and RN notified provider for clearance to discharge.

## 2024-03-29 NOTE — PRE-PROCEDURE
GENERAL PRE-PROCEDURE:   Procedure:  Pacemaker implant    Written consent obtained?: Yes    Risks and benefits: Risks, benefits and alternatives were discussed    Consent given by:  Patient  Patient states understanding of procedure being performed: Yes    Patient's understanding of procedure matches consent: Yes    Procedure consent matches procedure scheduled: Yes    Expected level of sedation:  Moderate  Appropriately NPO:  Yes  ASA Class:  3  Mallampati  :  Grade 2- soft palate, base of uvula, tonsillar pillars, and portion of posterior pharyngeal wall visible  Lungs:  Lungs clear with good breath sounds bilaterally  Heart:  Normal heart sounds and rate  History & Physical reviewed:  History and physical reviewed and no updates needed  Statement of review:  I have reviewed the lab findings, diagnostic data, medications, and the plan for sedation

## 2024-03-29 NOTE — Clinical Note
Hemodynamic equipment used: 5 lead ECG, [employer] With 3 Leads, Machine BP Cuff and pulse oximeter probe.

## 2024-03-29 NOTE — PROCEDURES
PROCEDURES PERFORMED:  - Implantation of dual-chamber permanent pacemaker  - Cardiac fluoroscopy   - Conscious sedation, mild-moderate level    INDICATIONS:  Sinus node dysfunction      PROCEDURE:  The benefits and risks of the procedure were discussed with the patient in detail; the patient expressed understanding.  Informed consent was obtained and placed in the chart.  I determined this patient to be an appropriate candidate for the planned sedation and procedure and have reassessed the patient immediately prior to sedation and procedure. The patient was brought to the EP lab in the fasting, non-sedated state.  We continuously monitored EKG, vital signs, oxygenation and level of sedation.  I was present during the entire time that conscious sedation was provided.  Antibiotic prophylaxis was administered.  The chest was prepped and draped in the usual sterile fashion.      Local anesthetic was administered.  Access to the axillary vein was obtained with ultrasound guidance using the modified Seldinger technique.  Two wires were advanced to the IVC.  An incision was made in the left pectoral area.  Dissection was carried down to the myofascial plane and then continued caudally to form the pocket.  Adequate hemostasis was obtained.      Two 6.0 Fr sheaths were introduced for atrial and ventricular leads.  The right ventricular lead was advanced to the apical septum and the right atrial lead was advanced to the appendage and screwed into position under fluoroscopy.  The leads were tested and found to have adequate sensing, impedance, and threshold.  No diaphragmatic stimulation was noted at 10 V output.  The sheathes were peeled away and both leads were secured to the pectoral muscle using Ethibond sutures.    Normal saline was used to irrigate the pocket. The generator was securely attached to the leads and then placed in the pocket. The device was sutured in the pocket with an Ethibond  suture. The pocket was  closed in layers.  The deep layers were closed using 2.0 and 3.0 Vicryl and the subcuticular layer was closed with 4.0 Vicryl suture. Dermabond was applied to the skin. The incision was covered with a sterile dressing.    There was minimal blood loss (<30 mL) and no immediate complications.  The patient tolerated the procedure well.    Implanted Hardware and Parameters:  Implant Name Type Inv. Item Serial No.  Lot No. LRB No. Used Action   LEAD INGEVITY+ AF IS1 7842 59CM - [id number] Leads LEAD INGEVITY+ AF IS1 7842 59CM [id number] [employer] [id number]  1 Implanted   LEAD INGEVITY+ AF IS1 7841 52CM - [id number] Leads LEAD INGEVITY+ AF IS1 7841 52CM [id number] [employer] [id number]  1 Implanted   PCMKR CARD ACCOLADE MRI MILAGROS DR - [id number] Pacemaker PCMKR CARD ACCOLADE MRI EL  [id number] [employer] [id number]  1 Implanted             CONCLUSIONS:  - Successful implantation of a dual-chamber permanent pacemaker  - Routine follow up after discharge      Farhad Parisi MD

## 2024-03-29 NOTE — PROGRESS NOTES
[facility] Discharge Nursing Note    Patient Information  Name: Itz Jaimes  Age: 79 year old    Discharge Education:  Discharge instructions reviewed: Yes  Additional education/resources provided: [employer] completed their education with family and patient  Patient/patient representative verbalizes understanding: Yes  Patient discharging on new medications: No  Medication education completed: Yes Per Dr. Parisi, patient to continue Eliquis tonight.     Per Dr. Parisi, patient's x-ray is good and patient is able to discharge.     Discharge Plans:   Discharge location: home  Discharge ride contacted: Yes  Approximate discharge time: 1612    Discharge Criteria:  Discharge criteria met and vital signs stable: Yes    Patient Belongs:  Patient belongings returned to patient: Yes    Katherin Toure RN

## 2024-04-01 ENCOUNTER — TELEPHONE (OUTPATIENT)
Dept: CARDIOLOGY | Facility: CLINIC | Age: 79
End: 2024-04-01
Payer: COMMERCIAL

## 2024-04-01 NOTE — TELEPHONE ENCOUNTER
Received a call from El (pharmacist) at [facility] in [place] to discuss drug interaction.  Patient on primidone and eliquis.  Per micoromedex severe drug interaction noted--Concurrent use of APIXABAN and PRIMIDONE may result in reduced apixaban exposure and increased risk of thrombotic events.  Will review with Dr Parisi in [place] Mccloud RN

## 2024-04-01 NOTE — PROGRESS NOTES
Post device implant discharge phone call.    Reviewed the following:  - No raising arm above shoulder on the side of implant for 3 weeks  - Remove outer dressing 3 days after implant. May shower after outer dressing removed.   - Leave steri-strips in place, will be removed at 1 week device check  - Limit driving for: 1 week (PPM)  - Watch for redness, drainage, warmth, or fever. Call device clinic if any signs of infection.     1 week device check scheduled: 4/5/24    Pt states understanding of all instructions. NOLAN Hernandez

## 2024-04-02 LAB
ATRIAL RATE - MUSE: 50 BPM
ATRIAL RATE - MUSE: 60 BPM
DIASTOLIC BLOOD PRESSURE - MUSE: NORMAL MMHG
DIASTOLIC BLOOD PRESSURE - MUSE: NORMAL MMHG
INTERPRETATION ECG - MUSE: NORMAL
INTERPRETATION ECG - MUSE: NORMAL
P AXIS - MUSE: 23 DEGREES
P AXIS - MUSE: 56 DEGREES
PR INTERVAL - MUSE: 216 MS
PR INTERVAL - MUSE: 312 MS
QRS DURATION - MUSE: 102 MS
QRS DURATION - MUSE: 110 MS
QT - MUSE: 500 MS
QT - MUSE: 508 MS
QTC - MUSE: 455 MS
QTC - MUSE: 508 MS
R AXIS - MUSE: -4 DEGREES
R AXIS - MUSE: 19 DEGREES
SYSTOLIC BLOOD PRESSURE - MUSE: NORMAL MMHG
SYSTOLIC BLOOD PRESSURE - MUSE: NORMAL MMHG
T AXIS - MUSE: 51 DEGREES
T AXIS - MUSE: 71 DEGREES
VENTRICULAR RATE- MUSE: 50 BPM
VENTRICULAR RATE- MUSE: 60 BPM

## 2024-04-02 NOTE — TELEPHONE ENCOUNTER
Spoke to patient to review recommendations d/t severe interaction of primidone and eliquis.  It is recommended that he switch to warfarin.  Discussed what entails being on warfarin.  Other suggestion would be to reach out to the provider who ordered the primidone (per patient it is his neurologist) he reports he has been on this medication for many years for his essential tremors.  Discussed with patient that he can discuss this with his neurologist as there are other medications that do not interact with eliquis.  He will reach out to neurology for recommendations and get back to us on how he wants to proceed. NOLAN Vargas

## 2024-04-05 ENCOUNTER — LAB (OUTPATIENT)
Dept: LAB | Facility: CLINIC | Age: 79
End: 2024-04-05
Payer: COMMERCIAL

## 2024-04-05 ENCOUNTER — TELEPHONE (OUTPATIENT)
Dept: CARDIOLOGY | Facility: CLINIC | Age: 79
End: 2024-04-05

## 2024-04-05 ENCOUNTER — ANCILLARY PROCEDURE (OUTPATIENT)
Dept: CARDIOLOGY | Facility: CLINIC | Age: 79
End: 2024-04-05
Attending: INTERNAL MEDICINE
Payer: COMMERCIAL

## 2024-04-05 DIAGNOSIS — I49.5 SICK SINUS SYNDROME (H): ICD-10-CM

## 2024-04-05 DIAGNOSIS — I48.91 ATRIAL FIBRILLATION WITH RVR (H): ICD-10-CM

## 2024-04-05 DIAGNOSIS — I48.0 PAROXYSMAL ATRIAL FIBRILLATION (H): ICD-10-CM

## 2024-04-05 LAB — MAGNESIUM SERPL-MCNC: 1.8 MG/DL (ref 1.7–2.3)

## 2024-04-05 PROCEDURE — 83735 ASSAY OF MAGNESIUM: CPT | Performed by: PHYSICIAN ASSISTANT

## 2024-04-05 PROCEDURE — 36415 COLL VENOUS BLD VENIPUNCTURE: CPT | Performed by: PHYSICIAN ASSISTANT

## 2024-04-05 PROCEDURE — 93280 PM DEVICE PROGR EVAL DUAL: CPT | Performed by: INTERNAL MEDICINE

## 2024-04-05 NOTE — TELEPHONE ENCOUNTER
Patient seen in clinic for his 1 week check s/p PPM implant on 3/29/24.    Incision/Complications: small hematoma with significant bruising around site extending down below chest and left arm pit. Patient states swelling has improved and bruising is starting to yellow. No signs of infection. Steri strips applied at end of check to help with keeping incision approximated per new clinic protocol. Patient knows to monitor incision closely and contact the clinic with any changes or concerns.

## 2024-04-08 LAB
MDC_IDC_LEAD_CONNECTION_STATUS: NORMAL
MDC_IDC_LEAD_CONNECTION_STATUS: NORMAL
MDC_IDC_LEAD_IMPLANT_DT: NORMAL
MDC_IDC_LEAD_IMPLANT_DT: NORMAL
MDC_IDC_LEAD_LOCATION: NORMAL
MDC_IDC_LEAD_LOCATION: NORMAL
MDC_IDC_LEAD_LOCATION_DETAIL_1: NORMAL
MDC_IDC_LEAD_LOCATION_DETAIL_1: NORMAL
MDC_IDC_LEAD_MFG: NORMAL
MDC_IDC_LEAD_MFG: NORMAL
MDC_IDC_LEAD_MODEL: NORMAL
MDC_IDC_LEAD_MODEL: NORMAL
MDC_IDC_LEAD_POLARITY_TYPE: NORMAL
MDC_IDC_LEAD_POLARITY_TYPE: NORMAL
MDC_IDC_LEAD_SERIAL: NORMAL
MDC_IDC_LEAD_SERIAL: NORMAL
MDC_IDC_MSMT_BATTERY_DTM: NORMAL
MDC_IDC_MSMT_BATTERY_REMAINING_LONGEVITY: 132 MO
MDC_IDC_MSMT_BATTERY_REMAINING_PERCENTAGE: 100 %
MDC_IDC_MSMT_BATTERY_STATUS: NORMAL
MDC_IDC_MSMT_LEADCHNL_RA_IMPEDANCE_VALUE: 601 OHM
MDC_IDC_MSMT_LEADCHNL_RA_PACING_THRESHOLD_AMPLITUDE: 1.3 V
MDC_IDC_MSMT_LEADCHNL_RA_PACING_THRESHOLD_PULSEWIDTH: 0.4 MS
MDC_IDC_MSMT_LEADCHNL_RV_IMPEDANCE_VALUE: 732 OHM
MDC_IDC_MSMT_LEADCHNL_RV_PACING_THRESHOLD_AMPLITUDE: 1 V
MDC_IDC_MSMT_LEADCHNL_RV_PACING_THRESHOLD_PULSEWIDTH: 0.4 MS
MDC_IDC_PG_IMPLANT_DTM: NORMAL
MDC_IDC_PG_MFG: NORMAL
MDC_IDC_PG_MODEL: NORMAL
MDC_IDC_PG_SERIAL: NORMAL
MDC_IDC_PG_TYPE: NORMAL
MDC_IDC_SESS_CLINIC_NAME: NORMAL
MDC_IDC_SESS_DTM: NORMAL
MDC_IDC_SESS_TYPE: NORMAL
MDC_IDC_SET_BRADY_AT_MODE_SWITCH_MODE: NORMAL
MDC_IDC_SET_BRADY_AT_MODE_SWITCH_RATE: 170 {BEATS}/MIN
MDC_IDC_SET_BRADY_LOWRATE: 60 {BEATS}/MIN
MDC_IDC_SET_BRADY_MAX_SENSOR_RATE: 130 {BEATS}/MIN
MDC_IDC_SET_BRADY_MAX_TRACKING_RATE: 130 {BEATS}/MIN
MDC_IDC_SET_BRADY_MODE: NORMAL
MDC_IDC_SET_BRADY_PAV_DELAY_HIGH: 200 MS
MDC_IDC_SET_BRADY_PAV_DELAY_LOW: 300 MS
MDC_IDC_SET_BRADY_SAV_DELAY_HIGH: 200 MS
MDC_IDC_SET_BRADY_SAV_DELAY_LOW: 300 MS
MDC_IDC_SET_LEADCHNL_RA_PACING_AMPLITUDE: 3 V
MDC_IDC_SET_LEADCHNL_RA_PACING_CAPTURE_MODE: NORMAL
MDC_IDC_SET_LEADCHNL_RA_PACING_POLARITY: NORMAL
MDC_IDC_SET_LEADCHNL_RA_PACING_PULSEWIDTH: 0.4 MS
MDC_IDC_SET_LEADCHNL_RA_SENSING_ADAPTATION_MODE: NORMAL
MDC_IDC_SET_LEADCHNL_RA_SENSING_POLARITY: NORMAL
MDC_IDC_SET_LEADCHNL_RA_SENSING_SENSITIVITY: 0.25 MV
MDC_IDC_SET_LEADCHNL_RV_PACING_AMPLITUDE: 1.5 V
MDC_IDC_SET_LEADCHNL_RV_PACING_CAPTURE_MODE: NORMAL
MDC_IDC_SET_LEADCHNL_RV_PACING_POLARITY: NORMAL
MDC_IDC_SET_LEADCHNL_RV_PACING_PULSEWIDTH: 0.4 MS
MDC_IDC_SET_LEADCHNL_RV_SENSING_ADAPTATION_MODE: NORMAL
MDC_IDC_SET_LEADCHNL_RV_SENSING_POLARITY: NORMAL
MDC_IDC_SET_LEADCHNL_RV_SENSING_SENSITIVITY: 1.5 MV
MDC_IDC_SET_ZONE_DETECTION_INTERVAL: 375 MS
MDC_IDC_SET_ZONE_STATUS: NORMAL
MDC_IDC_SET_ZONE_TYPE: NORMAL
MDC_IDC_SET_ZONE_VENDOR_TYPE: NORMAL
MDC_IDC_STAT_AT_BURDEN_PERCENT: 0 %
MDC_IDC_STAT_AT_DTM_END: NORMAL
MDC_IDC_STAT_AT_DTM_START: NORMAL
MDC_IDC_STAT_BRADY_DTM_END: NORMAL
MDC_IDC_STAT_BRADY_DTM_START: NORMAL
MDC_IDC_STAT_BRADY_RA_PERCENT_PACED: 98 %
MDC_IDC_STAT_BRADY_RV_PERCENT_PACED: 2 %
MDC_IDC_STAT_EPISODE_RECENT_COUNT: 0
MDC_IDC_STAT_EPISODE_RECENT_COUNT_DTM_END: NORMAL
MDC_IDC_STAT_EPISODE_RECENT_COUNT_DTM_START: NORMAL
MDC_IDC_STAT_EPISODE_TYPE: NORMAL
MDC_IDC_STAT_EPISODE_VENDOR_TYPE: NORMAL
MDC_IDC_STAT_EPISODE_VENDOR_TYPE: NORMAL

## 2024-04-19 ENCOUNTER — TELEPHONE (OUTPATIENT)
Dept: CARDIOLOGY | Facility: CLINIC | Age: 79
End: 2024-04-19
Payer: COMMERCIAL

## 2024-04-19 NOTE — TELEPHONE ENCOUNTER
Received message from patient stating that his neurologist suggested he take Topiramate in place of Primidone.  He would take Topiramate 25mg for 1 week and then would increase the dose to 25mg twice daily.     He wanted to make sure that this was okay with his cardiologist.      Attempted to call patient and let him know that we will have this reviewed, however there was no answer and no option for voicemail. Will route message to Dr. Parisi for review and will attempt to call patient again after hearing back from Dr. Parisi.     NOLAN August

## 2024-04-23 NOTE — TELEPHONE ENCOUNTER
Farhad Parisi MD  You4 days ago     YD  That is fine with me.  Thanks!     Received the above response from Dr. Parisi regarding patient's neurologist recommending he take Topiramate in place of Primidone (see note from 4/19/24).      Called and updated patient on Dr. Parisi's response.  Pt verbalized understanding and appreciation for response.  He will reach out to the neurologist to get his prescription.      NOLAN August

## 2024-05-10 ENCOUNTER — ANCILLARY PROCEDURE (OUTPATIENT)
Dept: CARDIOLOGY | Facility: CLINIC | Age: 79
End: 2024-05-10
Attending: INTERNAL MEDICINE
Payer: COMMERCIAL

## 2024-05-10 DIAGNOSIS — I49.5 SICK SINUS SYNDROME (H): ICD-10-CM

## 2024-05-10 DIAGNOSIS — I48.0 PAROXYSMAL ATRIAL FIBRILLATION (H): ICD-10-CM

## 2024-05-10 LAB
MDC_IDC_LEAD_CONNECTION_STATUS: NORMAL
MDC_IDC_LEAD_CONNECTION_STATUS: NORMAL
MDC_IDC_LEAD_IMPLANT_DT: NORMAL
MDC_IDC_LEAD_IMPLANT_DT: NORMAL
MDC_IDC_LEAD_LOCATION: NORMAL
MDC_IDC_LEAD_LOCATION: NORMAL
MDC_IDC_LEAD_LOCATION_DETAIL_1: NORMAL
MDC_IDC_LEAD_LOCATION_DETAIL_1: NORMAL
MDC_IDC_LEAD_MFG: NORMAL
MDC_IDC_LEAD_MFG: NORMAL
MDC_IDC_LEAD_MODEL: NORMAL
MDC_IDC_LEAD_MODEL: NORMAL
MDC_IDC_LEAD_POLARITY_TYPE: NORMAL
MDC_IDC_LEAD_POLARITY_TYPE: NORMAL
MDC_IDC_LEAD_SERIAL: NORMAL
MDC_IDC_LEAD_SERIAL: NORMAL
MDC_IDC_MSMT_BATTERY_DTM: NORMAL
MDC_IDC_MSMT_BATTERY_REMAINING_LONGEVITY: 168 MO
MDC_IDC_MSMT_BATTERY_REMAINING_PERCENTAGE: 100 %
MDC_IDC_MSMT_BATTERY_STATUS: NORMAL
MDC_IDC_MSMT_LEADCHNL_RA_IMPEDANCE_VALUE: 636 OHM
MDC_IDC_MSMT_LEADCHNL_RA_PACING_THRESHOLD_AMPLITUDE: 1 V
MDC_IDC_MSMT_LEADCHNL_RA_PACING_THRESHOLD_PULSEWIDTH: 0.4 MS
MDC_IDC_MSMT_LEADCHNL_RV_IMPEDANCE_VALUE: 727 OHM
MDC_IDC_MSMT_LEADCHNL_RV_PACING_THRESHOLD_AMPLITUDE: 0.7 V
MDC_IDC_MSMT_LEADCHNL_RV_PACING_THRESHOLD_PULSEWIDTH: 0.4 MS
MDC_IDC_PG_IMPLANT_DTM: NORMAL
MDC_IDC_PG_MFG: NORMAL
MDC_IDC_PG_MODEL: NORMAL
MDC_IDC_PG_SERIAL: NORMAL
MDC_IDC_PG_TYPE: NORMAL
MDC_IDC_SESS_CLINIC_NAME: NORMAL
MDC_IDC_SESS_DTM: NORMAL
MDC_IDC_SESS_TYPE: NORMAL
MDC_IDC_SET_BRADY_AT_MODE_SWITCH_MODE: NORMAL
MDC_IDC_SET_BRADY_AT_MODE_SWITCH_RATE: 170 {BEATS}/MIN
MDC_IDC_SET_BRADY_LOWRATE: 60 {BEATS}/MIN
MDC_IDC_SET_BRADY_MAX_SENSOR_RATE: 130 {BEATS}/MIN
MDC_IDC_SET_BRADY_MAX_TRACKING_RATE: 130 {BEATS}/MIN
MDC_IDC_SET_BRADY_MODE: NORMAL
MDC_IDC_SET_BRADY_PAV_DELAY_HIGH: 200 MS
MDC_IDC_SET_BRADY_PAV_DELAY_LOW: 300 MS
MDC_IDC_SET_BRADY_SAV_DELAY_HIGH: 200 MS
MDC_IDC_SET_BRADY_SAV_DELAY_LOW: 300 MS
MDC_IDC_SET_LEADCHNL_RA_PACING_AMPLITUDE: 2 V
MDC_IDC_SET_LEADCHNL_RA_PACING_CAPTURE_MODE: NORMAL
MDC_IDC_SET_LEADCHNL_RA_PACING_POLARITY: NORMAL
MDC_IDC_SET_LEADCHNL_RA_PACING_PULSEWIDTH: 0.4 MS
MDC_IDC_SET_LEADCHNL_RA_SENSING_ADAPTATION_MODE: NORMAL
MDC_IDC_SET_LEADCHNL_RA_SENSING_POLARITY: NORMAL
MDC_IDC_SET_LEADCHNL_RA_SENSING_SENSITIVITY: 0.25 MV
MDC_IDC_SET_LEADCHNL_RV_PACING_AMPLITUDE: 1.3 V
MDC_IDC_SET_LEADCHNL_RV_PACING_CAPTURE_MODE: NORMAL
MDC_IDC_SET_LEADCHNL_RV_PACING_POLARITY: NORMAL
MDC_IDC_SET_LEADCHNL_RV_PACING_PULSEWIDTH: 0.4 MS
MDC_IDC_SET_LEADCHNL_RV_SENSING_ADAPTATION_MODE: NORMAL
MDC_IDC_SET_LEADCHNL_RV_SENSING_POLARITY: NORMAL
MDC_IDC_SET_LEADCHNL_RV_SENSING_SENSITIVITY: 1.5 MV
MDC_IDC_SET_ZONE_DETECTION_INTERVAL: 375 MS
MDC_IDC_SET_ZONE_STATUS: NORMAL
MDC_IDC_SET_ZONE_TYPE: NORMAL
MDC_IDC_SET_ZONE_VENDOR_TYPE: NORMAL
MDC_IDC_STAT_AT_BURDEN_PERCENT: 0 %
MDC_IDC_STAT_AT_DTM_END: NORMAL
MDC_IDC_STAT_AT_DTM_START: NORMAL
MDC_IDC_STAT_BRADY_DTM_END: NORMAL
MDC_IDC_STAT_BRADY_DTM_START: NORMAL
MDC_IDC_STAT_BRADY_RA_PERCENT_PACED: 98 %
MDC_IDC_STAT_BRADY_RV_PERCENT_PACED: 0 %
MDC_IDC_STAT_EPISODE_RECENT_COUNT: 0
MDC_IDC_STAT_EPISODE_RECENT_COUNT_DTM_END: NORMAL
MDC_IDC_STAT_EPISODE_RECENT_COUNT_DTM_START: NORMAL
MDC_IDC_STAT_EPISODE_TYPE: NORMAL
MDC_IDC_STAT_EPISODE_VENDOR_TYPE: NORMAL
MDC_IDC_STAT_EPISODE_VENDOR_TYPE: NORMAL

## 2024-05-10 PROCEDURE — 93280 PM DEVICE PROGR EVAL DUAL: CPT | Performed by: INTERNAL MEDICINE

## 2024-07-10 ENCOUNTER — APPOINTMENT (OUTPATIENT)
Dept: URBAN - METROPOLITAN AREA CLINIC 255 | Age: 79
Setting detail: DERMATOLOGY
End: 2024-07-10

## 2024-07-10 VITALS — HEIGHT: 73 IN | WEIGHT: 194 LBS

## 2024-07-10 DIAGNOSIS — D22 MELANOCYTIC NEVI: ICD-10-CM

## 2024-07-10 DIAGNOSIS — D18.0 HEMANGIOMA: ICD-10-CM

## 2024-07-10 DIAGNOSIS — Z85.828 PERSONAL HISTORY OF OTHER MALIGNANT NEOPLASM OF SKIN: ICD-10-CM

## 2024-07-10 DIAGNOSIS — L82.1 OTHER SEBORRHEIC KERATOSIS: ICD-10-CM

## 2024-07-10 DIAGNOSIS — Z86.007 PERSONAL HISTORY OF IN-SITU NEOPLASM OF SKIN: ICD-10-CM

## 2024-07-10 DIAGNOSIS — Z71.89 OTHER SPECIFIED COUNSELING: ICD-10-CM

## 2024-07-10 DIAGNOSIS — L81.4 OTHER MELANIN HYPERPIGMENTATION: ICD-10-CM

## 2024-07-10 DIAGNOSIS — L57.8 OTHER SKIN CHANGES DUE TO CHRONIC EXPOSURE TO NONIONIZING RADIATION: ICD-10-CM

## 2024-07-10 DIAGNOSIS — L57.0 ACTINIC KERATOSIS: ICD-10-CM

## 2024-07-10 DIAGNOSIS — L82.0 INFLAMED SEBORRHEIC KERATOSIS: ICD-10-CM

## 2024-07-10 DIAGNOSIS — D36.1 BENIGN NEOPLASM OF PERIPHERAL NERVES AND AUTONOMIC NERVOUS SYSTEM: ICD-10-CM

## 2024-07-10 PROBLEM — D18.01 HEMANGIOMA OF SKIN AND SUBCUTANEOUS TISSUE: Status: ACTIVE | Noted: 2024-07-10

## 2024-07-10 PROBLEM — D36.14 BENIGN NEOPLASM OF PERIPHERAL NERVES AND AUTONOMIC NERVOUS SYSTEM OF THORAX: Status: ACTIVE | Noted: 2024-07-10

## 2024-07-10 PROBLEM — D22.5 MELANOCYTIC NEVI OF TRUNK: Status: ACTIVE | Noted: 2024-07-10

## 2024-07-10 PROCEDURE — OTHER MIPS QUALITY: OTHER

## 2024-07-10 PROCEDURE — 99213 OFFICE O/P EST LOW 20 MIN: CPT | Mod: 25

## 2024-07-10 PROCEDURE — 17110 DESTRUCT B9 LESION 1-14: CPT

## 2024-07-10 PROCEDURE — OTHER COUNSELING: OTHER

## 2024-07-10 PROCEDURE — 17003 DESTRUCT PREMALG LES 2-14: CPT | Mod: 59

## 2024-07-10 PROCEDURE — OTHER LIQUID NITROGEN: OTHER

## 2024-07-10 PROCEDURE — 17000 DESTRUCT PREMALG LESION: CPT | Mod: 59

## 2024-07-10 ASSESSMENT — LOCATION SIMPLE DESCRIPTION DERM
LOCATION SIMPLE: RIGHT CHEEK
LOCATION SIMPLE: RIGHT FOREHEAD
LOCATION SIMPLE: RIGHT EAR
LOCATION SIMPLE: LEFT UPPER BACK
LOCATION SIMPLE: NOSE
LOCATION SIMPLE: SCALP

## 2024-07-10 ASSESSMENT — LOCATION DETAILED DESCRIPTION DERM
LOCATION DETAILED: LEFT MID-UPPER BACK
LOCATION DETAILED: RIGHT NASAL ROOT
LOCATION DETAILED: LEFT MEDIAL UPPER BACK
LOCATION DETAILED: LEFT SUPERIOR MEDIAL UPPER BACK
LOCATION DETAILED: LEFT INFERIOR FRONTAL SCALP
LOCATION DETAILED: NASAL DORSUM
LOCATION DETAILED: RIGHT MEDIAL MALAR CHEEK
LOCATION DETAILED: RIGHT SUPERIOR HELIX
LOCATION DETAILED: RIGHT MEDIAL FOREHEAD

## 2024-07-10 ASSESSMENT — LOCATION ZONE DERM
LOCATION ZONE: SCALP
LOCATION ZONE: TRUNK
LOCATION ZONE: FACE
LOCATION ZONE: EAR
LOCATION ZONE: NOSE

## 2024-07-10 NOTE — PROCEDURE: LIQUID NITROGEN
Medical Necessity Clause: This procedure was medically necessary because the lesions that were treated were:
Render Post-Care Instructions In Note?: no
Show Aperture Variable?: Yes
Detail Level: Detailed
Spray Paint Text: The liquid nitrogen was applied to the skin utilizing a spray paint frosting technique.
Post-Care Instructions: I reviewed with the patient in detail post-care instructions. Patient is to wear sunprotection, and avoid picking at any of the treated lesions. Pt may apply Vaseline to crusted or scabbing areas.
Medical Necessity Information: It is in your best interest to select a reason for this procedure from the list below. All of these items fulfill various CMS LCD requirements except the new and changing color options.
Consent: The patient's consent was obtained including but not limited to risks of crusting, scabbing, blistering, scarring, darker or lighter pigmentary change, recurrence, incomplete removal and infection.
Duration Of Freeze Thaw-Cycle (Seconds): 0
Number Of Freeze-Thaw Cycles: 2 freeze-thaw cycles

## 2024-07-23 ENCOUNTER — OFFICE VISIT (OUTPATIENT)
Dept: CARDIOLOGY | Facility: CLINIC | Age: 79
End: 2024-07-23
Attending: PHYSICIAN ASSISTANT
Payer: COMMERCIAL

## 2024-07-23 VITALS
BODY MASS INDEX: 26.77 KG/M2 | OXYGEN SATURATION: 98 % | HEART RATE: 57 BPM | WEIGHT: 202 LBS | DIASTOLIC BLOOD PRESSURE: 78 MMHG | HEIGHT: 73 IN | SYSTOLIC BLOOD PRESSURE: 152 MMHG

## 2024-07-23 DIAGNOSIS — I48.91 ATRIAL FIBRILLATION WITH RVR (H): ICD-10-CM

## 2024-07-23 DIAGNOSIS — I49.5 SINUS NODE DYSFUNCTION (H): ICD-10-CM

## 2024-07-23 DIAGNOSIS — Z95.0 CARDIAC PACEMAKER IN SITU: Primary | ICD-10-CM

## 2024-07-23 DIAGNOSIS — I48.0 PAROXYSMAL ATRIAL FIBRILLATION (H): ICD-10-CM

## 2024-07-23 DIAGNOSIS — Z79.01 CHRONIC ANTICOAGULATION: ICD-10-CM

## 2024-07-23 PROCEDURE — 99214 OFFICE O/P EST MOD 30 MIN: CPT | Performed by: INTERNAL MEDICINE

## 2024-07-23 PROCEDURE — 93000 ELECTROCARDIOGRAM COMPLETE: CPT | Performed by: INTERNAL MEDICINE

## 2024-07-23 RX ORDER — TOPIRAMATE 25 MG/1
25 TABLET, FILM COATED ORAL DAILY
COMMUNITY
Start: 2024-04-24

## 2024-07-23 NOTE — PROGRESS NOTES
[facility] HEART CLINIC  Cardiac Electrophysiology Clinic    Itz Jaimes MRN# [id number]   YOB: 1945 Age: 79 year old       I had the pleasure of seeing Itz Jaimes  who is a 79 year old male with history of hypertension, hyperlipidemia, hypothyroidism, CKD stage 2, essential tremors on propranolol, vertebrobasilar artery occlusion, paroxysmal atrial fibrillation, and sinus node dysfunction s/p dual-chamber pacemaker ([employer] 3/29/2024).  I initially met him when he was admitted to the hospital in 2/2024 with new onset atrial fibrillation.  He was started on IV diltiazem and converted spontaneously to sinus rhythm.  We started him on low-dose flecainide 50 mg twice daily for rhythm control.  He did have sinus bradycardia with average heart rate of 49 bpm on Zio patch monitoring after discharge.  He followed up with Nathaly Looney on 3/8/2024 and ultimately decided to proceed with pacemaker implant.      Today's Visit:  He has been doing well and has not had any recurrent atrial fibrillation.  He has not had any issues with the pacemaker.  He feels he has good energy levels and is walking for exercise.  He says he does a lot of walking when he is at the [employer].  He reports no chest pain, shortness of breath, or dizziness.        Diagnostic Testing:  EKG today, which I overread, showed atrial paced rhythm, rate 60 bpm  EKG 3/29/2024-A paced, rate 60 bpm  EKG 3/8/2024-sinus bradycardia rate 52 bpm  EKG 2/7/2024 - Sinus bradycardia, rate 48 bpm.  Incomplete RBBB.  , QRS 98, QTc 409 ms  EKG 2/6/2024 - Afib, rate 150 bpm      Device check 5/10/2024  [employer] L331 ACCOLADE MRI EL (D)  6 week Post Pacemaker Device Check  Patient seen in clinic for device evaluation and iterative programming.   AP: 98 %    : 2 %    Mode: DDDR 60/130          Underlying Rhythm: SB in the mid 40's    Heart Rate: HRs 60-70 per histogram.    Sensing: WNL    Pacing Threshold:  Stable    Impedance: Stable    Battery Status: 14yrs      Holter Monitor 2/2024 - personally reviewed  7-day Zio patch monitor  Sinus rhythm rates 37 to 84 bpm, average 49 bpm  No A-fib recorded    Echocardiogram 2/8/2024    Probably normal left ventricular systolic function. However endocardial  definition was not optimal and subtle regional wall motion abnormalities could  be missed. Contrast would enhance regional wall motion analysis.  The visual ejection fraction is 55-60%.  There is moderate (2+) mitral regurgitation.  Right ventricular systolic pressure is elevated, consistent with mild  pulmonary hypertension.  There is mild (1+) aortic regurgitation.  The study was technically difficult.        Last Comprehensive Metabolic Panel:  Lab Results   Component Value Date     03/29/2024    POTASSIUM 4.2 03/29/2024    CHLORIDE 104 03/29/2024    CO2 26 03/29/2024    ANIONGAP 10 03/29/2024    GLC 98 03/29/2024    BUN 16.1 03/29/2024    CR 1.21 (H) 03/29/2024    GFRESTIMATED 61 03/29/2024    LORRIE 9.0 03/29/2024        Magnesium   Date Value Ref Range Status   04/05/2024 1.8 1.7 - 2.3 mg/dL Final   08/28/2008 2.1 1.6 - 2.3 mg/dL Final        TSH   Date Value Ref Range Status   02/06/2024 3.50 0.30 - 4.20 uIU/mL Final            Assessment/Plan:    79 year old male with history of hypertension, hyperlipidemia, hypothyroidism, CKD stage 2, essential tremors on propranolol, vertebrobasilar artery occlusion, paroxysmal atrial fibrillation, and sinus node dysfunction s/p dual-chamber pacemaker ([employer] 3/29/2024).  EKG today shows appropriate atrial pacing at 60 bpm.  His last device check showed normal function, he is atrial paced the majority of the time with underlying rhythm of sinus bradycardia in the 40s.  Heart rate's are mainly in the 60-70s bpm.  He does monitor his pulse with a smart watch and has recorded heart rates up to 110s with exertion.  He generally feels well and has not had any  limitations.  I did explain that we can increase his rate response if needed.  He is tolerating low-dose flecainide for rhythm control and remains on propranolol for his tremors.  He has not had significant recurrence of atrial fibrillation.  The patient's CHADS VASc is 3 (age, HTN, PAD), which warrants long term anticoagulation.  He is currently doing well with apixaban.      Continue flecainide 50 mg BID and propranolol   Continue apixaban 5 mg BID  Routine device checks and follow up      Farhad Parisi MD        Orders this Visit:  Orders Placed This Encounter   Procedures    Follow-Up with Cardiology EP    EKG 12-lead complete w/read - Clinics (performed today)    EKG 12-lead complete w/read - Clinics (performed today)     Orders Placed This Encounter   Medications    topiramate (TOPAMAX) 25 MG tablet     Sig: Take 25 mg by mouth daily     There are no discontinued medications.    Encounter Diagnoses   Name Primary?    Atrial fibrillation with RVR (H)     Cardiac pacemaker in situ Yes    Sinus node dysfunction (H)     Paroxysmal atrial fibrillation (H)     Chronic anticoagulation      Today's clinic visit entailed:  Review of the result(s) of each unique test - Echo  The following tests were independently interpreted by me as noted in my documentation: EKG, Holter, device check  35 minutes spent by me on the date of the encounter doing chart review, history and exam, documentation and further activities per the note    CURRENT MEDICATIONS:  Current Outpatient Medications   Medication Sig Dispense Refill    apixaban ANTICOAGULANT (ELIQUIS) 5 MG tablet Take 1 tablet (5 mg) by mouth 2 times daily 180 tablet 3    aspirin 81 MG EC tablet Take 81 mg by mouth daily      calcium carbonate (OS-LORRIE) 1500 (600 Ca) MG tablet Take 600 mg by mouth 2 times daily At 1200/2000      flecainide (TAMBOCOR) 50 MG tablet Take 1 tablet (50 mg) by mouth every 12 hours 180 tablet 3    levothyroxine (SYNTHROID/LEVOTHROID) 112 MCG tablet Take  "112 mcg by mouth daily      loratadine (CLARITIN) 10 MG tablet Take 10 mg by mouth daily      Magnesium Chloride 64 MG TABS Take 64 mg by mouth daily Take 2 tablets po daily 180 tablet 3    omega 3 1000 MG CAPS Take 1,000 mg by mouth 2 times daily At AM, 2000      omeprazole (PRILOSEC) 20 MG DR capsule Take 20 mg by mouth 2 times daily At 1200, 2000      propranolol ER (INDERAL LA) 160 MG 24 hr capsule Take 160 mg by mouth 2 times daily At AM, 1600      simvastatin (ZOCOR) 40 MG tablet Take 40 mg by mouth daily      topiramate (TOPAMAX) 25 MG tablet Take 25 mg by mouth daily      vitamin D3 (CHOLECALCIFEROL) 50 mcg (2000 units) tablet Take 1 tablet by mouth daily      primidone (MYSOLINE) 50 MG tablet Take 150 mg by mouth 2 times daily At AM, 2000 (Patient not taking: Reported on 7/23/2024)         Review of Systems:  Pertinent review of system as stated above in HPI    Skin:        Eyes:       ENT:       Respiratory:  Negative    Cardiovascular:    edema;Positive for  Gastroenterology:      Genitourinary:       Musculoskeletal:       Neurologic:  Positive for numbness or tingling of feet  Psychiatric:       Heme/Lymph/Imm:       Endocrine:  Negative thyroid disorder;diabetes    Physical Exam:  Vitals: BP (!) 152/78   Pulse 57   Ht 1.854 m (6' 1\")   Wt 91.6 kg (202 lb)   SpO2 98%   BMI 26.65 kg/m      Constitutional: Pleasant, no apparent distress.  Respiratory: Breathing non-labored.   Cardiovascular:  Regular rate and rhythm. Pacemaker in left upper chest, well healed incision   Neurologic: No gross motor deficits.   Psychiatric: Affect appropriate.        ALLERGIES  No Known Allergies    PAST MEDICAL HISTORY:  Past Medical History:   Diagnosis Date    Chronic kidney disease, stage 2 (mild)     Degeneration of lumbar or lumbosacral intervertebral disc     Essential tremor     GERD (gastroesophageal reflux disease)     Hyperlipidemia     Hypertension     Hypothyroidism     Vertebrobasilar artery syndrome     " Vitamin D deficiency        PAST SURGICAL HISTORY:  Past Surgical History:   Procedure Laterality Date    EP PACEMAKER DEVICE & LEAD IMPLANT- RIGHT ATRIAL & RIGHT VENTRICULAR N/A 3/29/2024    Procedure: Pacemaker Device & Lead Implant - Right Atrial & Right Ventricular;  Surgeon: Farhad Parisi MD;  Location:  HEART CARDIAC CATH LAB    IR PICC PLACEMENT > 5 YRS OF AGE  3/3/2017    LUMBAR FUSION      L3-S1    REPLACEMENT TOTAL KNEE Bilateral     ROTATOR CUFF REPAIR RT/LT         FAMILY HISTORY:  Family History   Problem Relation Age of Onset    Hypertension Mother     Prostate Cancer Father     Hypertension Sister        SOCIAL HISTORY:  Social History     Socioeconomic History    Marital status:      Spouse name: None    Number of children: None    Years of education: None    Highest education level: None   Tobacco Use    Smoking status: Never    Smokeless tobacco: Never   Substance and Sexual Activity    Alcohol use: Yes     Comment: Occasional     Social Determinants of Health     Financial Resource Strain: Low Risk  (7/18/2022)    [facility] & [facility]    Financial Resource Strain     Difficulty of Paying Living Expenses: 3   Food Insecurity: No Food Insecurity (7/18/2022)    [facility], [facility] & [facility]    Food Insecurity     Worried About Running Out of Food in the Last Year: 1   Transportation Needs: No Transportation Needs (7/18/2022)    [facility], [facility] & [facility]    Transportation Needs     Lack of Transportation (Medical): 1    Received [facility], [facility] & [facility]    Social Connections   Housing Stability: Low Risk  (7/18/2022)    [facility], [facility]    Housing Stability     Unable to Pay for Housing in the Last Year: 1             CC  Nathaly Looney PA-C  [address]  [place] [id number]

## 2024-07-23 NOTE — LETTER
7/23/2024    Bony Avila MD  [address], [place]. \[facility].\"" [id number]    RE: Itz Jaimes       Dear Colleague,     I had the pleasure of seeing Itz Jaimes in the [facility].  [facility] HEART CLINIC  Cardiac Electrophysiology Clinic    Itz Jaimes MRN# [id number]   YOB: 1945 Age: 79 year old       I had the pleasure of seeing Itz Jaimes  who is a 79 year old male with history of hypertension, hyperlipidemia, hypothyroidism, CKD stage 2, essential tremors on propranolol, vertebrobasilar artery occlusion, paroxysmal atrial fibrillation, and sinus node dysfunction s/p dual-chamber pacemaker ([employer] 3/29/2024).  I initially met him when he was admitted to the hospital in 2/2024 with new onset atrial fibrillation.  He was started on IV diltiazem and converted spontaneously to sinus rhythm.  We started him on low-dose flecainide 50 mg twice daily for rhythm control.  He did have sinus bradycardia with average heart rate of 49 bpm on Zio patch monitoring after discharge.  He followed up with Nathaly Looney on 3/8/2024 and ultimately decided to proceed with pacemaker implant.      Today's Visit:  He has been doing well and has not had any recurrent atrial fibrillation.  He has not had any issues with the pacemaker.  He feels he has good energy levels and is walking for exercise.  He says he does a lot of walking when he is at the [employer].  He reports no chest pain, shortness of breath, or dizziness.        Diagnostic Testing:  EKG today, which I overread, showed atrial paced rhythm, rate 60 bpm  EKG 3/29/2024-A paced, rate 60 bpm  EKG 3/8/2024-sinus bradycardia rate 52 bpm  EKG 2/7/2024 - Sinus bradycardia, rate 48 bpm.  Incomplete RBBB.  , QRS 98, QTc 409 ms  EKG 2/6/2024 - Afib, rate 150 bpm      Device check 5/10/2024  [employer] L331 ACCOLADE MRI EL (D)  6 week Post Pacemaker Device Check  Patient seen in clinic for  device evaluation and iterative programming.   AP: 98 %    : 2 %    Mode: DDDR 60/130          Underlying Rhythm: SB in the mid 40's    Heart Rate: HRs 60-70 per histogram.    Sensing: WNL    Pacing Threshold: Stable    Impedance: Stable    Battery Status: 14yrs      Holter Monitor 2/2024 - personally reviewed  7-day Zio patch monitor  Sinus rhythm rates 37 to 84 bpm, average 49 bpm  No A-fib recorded    Echocardiogram 2/8/2024    Probably normal left ventricular systolic function. However endocardial  definition was not optimal and subtle regional wall motion abnormalities could  be missed. Contrast would enhance regional wall motion analysis.  The visual ejection fraction is 55-60%.  There is moderate (2+) mitral regurgitation.  Right ventricular systolic pressure is elevated, consistent with mild  pulmonary hypertension.  There is mild (1+) aortic regurgitation.  The study was technically difficult.        Last Comprehensive Metabolic Panel:  Lab Results   Component Value Date     03/29/2024    POTASSIUM 4.2 03/29/2024    CHLORIDE 104 03/29/2024    CO2 26 03/29/2024    ANIONGAP 10 03/29/2024    GLC 98 03/29/2024    BUN 16.1 03/29/2024    CR 1.21 (H) 03/29/2024    GFRESTIMATED 61 03/29/2024    LORRIE 9.0 03/29/2024        Magnesium   Date Value Ref Range Status   04/05/2024 1.8 1.7 - 2.3 mg/dL Final   08/28/2008 2.1 1.6 - 2.3 mg/dL Final        TSH   Date Value Ref Range Status   02/06/2024 3.50 0.30 - 4.20 uIU/mL Final            Assessment/Plan:    79 year old male with history of hypertension, hyperlipidemia, hypothyroidism, CKD stage 2, essential tremors on propranolol, vertebrobasilar artery occlusion, paroxysmal atrial fibrillation, and sinus node dysfunction s/p dual-chamber pacemaker ([employer] 3/29/2024).  EKG today shows appropriate atrial pacing at 60 bpm.  His last device check showed normal function, he is atrial paced the majority of the time with underlying rhythm of sinus bradycardia  in the 40s.  Heart rate's are mainly in the 60-70s bpm.  He does monitor his pulse with a smart watch and has recorded heart rates up to 110s with exertion.  He generally feels well and has not had any limitations.  I did explain that we can increase his rate response if needed.  He is tolerating low-dose flecainide for rhythm control and remains on propranolol for his tremors.  He has not had significant recurrence of atrial fibrillation.  The patient's CHADS VASc is 3 (age, HTN, PAD), which warrants long term anticoagulation.  He is currently doing well with apixaban.      Continue flecainide 50 mg BID and propranolol   Continue apixaban 5 mg BID  Routine device checks and follow up      Farhad Parisi MD        Orders this Visit:  Orders Placed This Encounter   Procedures    Follow-Up with Cardiology EP    EKG 12-lead complete w/read - Clinics (performed today)    EKG 12-lead complete w/read - Clinics (performed today)     Orders Placed This Encounter   Medications    topiramate (TOPAMAX) 25 MG tablet     Sig: Take 25 mg by mouth daily     There are no discontinued medications.    Encounter Diagnoses   Name Primary?    Atrial fibrillation with RVR (H)     Cardiac pacemaker in situ Yes    Sinus node dysfunction (H)     Paroxysmal atrial fibrillation (H)     Chronic anticoagulation      Today's clinic visit entailed:  Review of the result(s) of each unique test - Echo  The following tests were independently interpreted by me as noted in my documentation: EKG, Holter, device check  35 minutes spent by me on the date of the encounter doing chart review, history and exam, documentation and further activities per the note    CURRENT MEDICATIONS:  Current Outpatient Medications   Medication Sig Dispense Refill    apixaban ANTICOAGULANT (ELIQUIS) 5 MG tablet Take 1 tablet (5 mg) by mouth 2 times daily 180 tablet 3    aspirin 81 MG EC tablet Take 81 mg by mouth daily      calcium carbonate (OS-LORRIE) 1500 (600 Ca) MG tablet Take  "600 mg by mouth 2 times daily At 1200/2000      flecainide (TAMBOCOR) 50 MG tablet Take 1 tablet (50 mg) by mouth every 12 hours 180 tablet 3    levothyroxine (SYNTHROID/LEVOTHROID) 112 MCG tablet Take 112 mcg by mouth daily      loratadine (CLARITIN) 10 MG tablet Take 10 mg by mouth daily      Magnesium Chloride 64 MG TABS Take 64 mg by mouth daily Take 2 tablets po daily 180 tablet 3    omega 3 1000 MG CAPS Take 1,000 mg by mouth 2 times daily At AM, 2000      omeprazole (PRILOSEC) 20 MG DR capsule Take 20 mg by mouth 2 times daily At 1200, 2000      propranolol ER (INDERAL LA) 160 MG 24 hr capsule Take 160 mg by mouth 2 times daily At AM, 1600      simvastatin (ZOCOR) 40 MG tablet Take 40 mg by mouth daily      topiramate (TOPAMAX) 25 MG tablet Take 25 mg by mouth daily      vitamin D3 (CHOLECALCIFEROL) 50 mcg (2000 units) tablet Take 1 tablet by mouth daily      primidone (MYSOLINE) 50 MG tablet Take 150 mg by mouth 2 times daily At AM, 2000 (Patient not taking: Reported on 7/23/2024)         Review of Systems:  Pertinent review of system as stated above in HPI    Skin:        Eyes:       ENT:       Respiratory:  Negative    Cardiovascular:    edema;Positive for  Gastroenterology:      Genitourinary:       Musculoskeletal:       Neurologic:  Positive for numbness or tingling of feet  Psychiatric:       Heme/Lymph/Imm:       Endocrine:  Negative thyroid disorder;diabetes    Physical Exam:  Vitals: BP (!) 152/78   Pulse 57   Ht 1.854 m (6' 1\")   Wt 91.6 kg (202 lb)   SpO2 98%   BMI 26.65 kg/m      Constitutional: Pleasant, no apparent distress.  Respiratory: Breathing non-labored.   Cardiovascular:  Regular rate and rhythm. Pacemaker in left upper chest, well healed incision   Neurologic: No gross motor deficits.   Psychiatric: Affect appropriate.        ALLERGIES  No Known Allergies    PAST MEDICAL HISTORY:  Past Medical History:   Diagnosis Date    Chronic kidney disease, stage 2 (mild)     Degeneration of " lumbar or lumbosacral intervertebral disc     Essential tremor     GERD (gastroesophageal reflux disease)     Hyperlipidemia     Hypertension     Hypothyroidism     Vertebrobasilar artery syndrome     Vitamin D deficiency        PAST SURGICAL HISTORY:  Past Surgical History:   Procedure Laterality Date    EP PACEMAKER DEVICE & LEAD IMPLANT- RIGHT ATRIAL & RIGHT VENTRICULAR N/A 3/29/2024    Procedure: Pacemaker Device & Lead Implant - Right Atrial & Right Ventricular;  Surgeon: Farhad Parisi MD;  Location:  HEART CARDIAC CATH LAB    IR PICC PLACEMENT > 5 YRS OF AGE  3/3/2017    LUMBAR FUSION      L3-S1    REPLACEMENT TOTAL KNEE Bilateral     ROTATOR CUFF REPAIR RT/LT         FAMILY HISTORY:  Family History   Problem Relation Age of Onset    Hypertension Mother     Prostate Cancer Father     Hypertension Sister        SOCIAL HISTORY:  Social History     Socioeconomic History    Marital status:      Spouse name: None    Number of children: None    Years of education: None    Highest education level: None   Tobacco Use    Smoking status: Never    Smokeless tobacco: Never   Substance and Sexual Activity    Alcohol use: Yes     Comment: Occasional     Social Determinants of Health     Financial Resource Strain: Low Risk  (7/18/2022)    [facility]    Financial Resource Strain     Difficulty of Paying Living Expenses: 3   Food Insecurity: No Food Insecurity (7/18/2022)    Received from [facility], [facility] & [facility]    Food Insecurity     Worried About Running Out of Food in the Last Year: 1   Transportation Needs: No Transportation Needs (7/18/2022)    Received from [facility], [facility] & [facility]    Transportation Needs     Lack of Transportation (Medical): 1    Received from [employer] &  [facility], [facility] & [facility]    Social Connections   Housing Stability: Low Risk  (7/18/2022)    Received from [facility] & [facility], [facility] & [facility]    Housing Stability     Unable to Pay for Housing in the Last Year: 1             CC  Nathaly Looney PA-C  [id number]  [place] [address]      Thank you for allowing me to participate in the care of your patient.      Sincerely,     Farhad Parisi MD     [facility]

## 2024-08-14 ENCOUNTER — ANCILLARY PROCEDURE (OUTPATIENT)
Dept: CARDIOLOGY | Facility: CLINIC | Age: 79
End: 2024-08-14
Attending: INTERNAL MEDICINE
Payer: COMMERCIAL

## 2024-08-14 DIAGNOSIS — I49.5 SICK SINUS SYNDROME (H): ICD-10-CM

## 2024-08-14 DIAGNOSIS — I48.0 PAROXYSMAL ATRIAL FIBRILLATION (H): ICD-10-CM

## 2024-08-14 PROCEDURE — 93296 REM INTERROG EVL PM/IDS: CPT | Performed by: INTERNAL MEDICINE

## 2024-08-14 PROCEDURE — 93294 REM INTERROG EVL PM/LDLS PM: CPT | Performed by: INTERNAL MEDICINE

## 2024-08-15 LAB
MDC_IDC_EPISODE_DTM: NORMAL
MDC_IDC_EPISODE_ID: NORMAL
MDC_IDC_EPISODE_TYPE: NORMAL
MDC_IDC_LEAD_CONNECTION_STATUS: NORMAL
MDC_IDC_LEAD_CONNECTION_STATUS: NORMAL
MDC_IDC_LEAD_IMPLANT_DT: NORMAL
MDC_IDC_LEAD_IMPLANT_DT: NORMAL
MDC_IDC_LEAD_LOCATION: NORMAL
MDC_IDC_LEAD_LOCATION: NORMAL
MDC_IDC_LEAD_LOCATION_DETAIL_1: NORMAL
MDC_IDC_LEAD_LOCATION_DETAIL_1: NORMAL
MDC_IDC_LEAD_MFG: NORMAL
MDC_IDC_LEAD_MFG: NORMAL
MDC_IDC_LEAD_MODEL: NORMAL
MDC_IDC_LEAD_MODEL: NORMAL
MDC_IDC_LEAD_POLARITY_TYPE: NORMAL
MDC_IDC_LEAD_POLARITY_TYPE: NORMAL
MDC_IDC_LEAD_SERIAL: NORMAL
MDC_IDC_LEAD_SERIAL: NORMAL
MDC_IDC_MSMT_BATTERY_DTM: NORMAL
MDC_IDC_MSMT_BATTERY_REMAINING_LONGEVITY: 162 MO
MDC_IDC_MSMT_BATTERY_REMAINING_PERCENTAGE: 100 %
MDC_IDC_MSMT_BATTERY_STATUS: NORMAL
MDC_IDC_MSMT_LEADCHNL_RA_IMPEDANCE_VALUE: 666 OHM
MDC_IDC_MSMT_LEADCHNL_RA_PACING_THRESHOLD_AMPLITUDE: 0.7 V
MDC_IDC_MSMT_LEADCHNL_RA_PACING_THRESHOLD_PULSEWIDTH: 0.4 MS
MDC_IDC_MSMT_LEADCHNL_RV_IMPEDANCE_VALUE: 713 OHM
MDC_IDC_MSMT_LEADCHNL_RV_PACING_THRESHOLD_AMPLITUDE: 0.9 V
MDC_IDC_MSMT_LEADCHNL_RV_PACING_THRESHOLD_PULSEWIDTH: 0.4 MS
MDC_IDC_PG_IMPLANT_DTM: NORMAL
MDC_IDC_PG_MFG: NORMAL
MDC_IDC_PG_MODEL: NORMAL
MDC_IDC_PG_SERIAL: NORMAL
MDC_IDC_PG_TYPE: NORMAL
MDC_IDC_SESS_CLINIC_NAME: NORMAL
MDC_IDC_SESS_DTM: NORMAL
MDC_IDC_SESS_TYPE: NORMAL
MDC_IDC_SET_BRADY_AT_MODE_SWITCH_MODE: NORMAL
MDC_IDC_SET_BRADY_AT_MODE_SWITCH_RATE: 170 {BEATS}/MIN
MDC_IDC_SET_BRADY_LOWRATE: 60 {BEATS}/MIN
MDC_IDC_SET_BRADY_MAX_SENSOR_RATE: 130 {BEATS}/MIN
MDC_IDC_SET_BRADY_MAX_TRACKING_RATE: 130 {BEATS}/MIN
MDC_IDC_SET_BRADY_MODE: NORMAL
MDC_IDC_SET_BRADY_PAV_DELAY_HIGH: 200 MS
MDC_IDC_SET_BRADY_PAV_DELAY_LOW: 300 MS
MDC_IDC_SET_BRADY_SAV_DELAY_HIGH: 200 MS
MDC_IDC_SET_BRADY_SAV_DELAY_LOW: 300 MS
MDC_IDC_SET_LEADCHNL_RA_PACING_AMPLITUDE: 2 V
MDC_IDC_SET_LEADCHNL_RA_PACING_CAPTURE_MODE: NORMAL
MDC_IDC_SET_LEADCHNL_RA_PACING_POLARITY: NORMAL
MDC_IDC_SET_LEADCHNL_RA_PACING_PULSEWIDTH: 0.4 MS
MDC_IDC_SET_LEADCHNL_RA_SENSING_ADAPTATION_MODE: NORMAL
MDC_IDC_SET_LEADCHNL_RA_SENSING_POLARITY: NORMAL
MDC_IDC_SET_LEADCHNL_RA_SENSING_SENSITIVITY: 0.25 MV
MDC_IDC_SET_LEADCHNL_RV_PACING_AMPLITUDE: 1.4 V
MDC_IDC_SET_LEADCHNL_RV_PACING_CAPTURE_MODE: NORMAL
MDC_IDC_SET_LEADCHNL_RV_PACING_POLARITY: NORMAL
MDC_IDC_SET_LEADCHNL_RV_PACING_PULSEWIDTH: 0.4 MS
MDC_IDC_SET_LEADCHNL_RV_SENSING_ADAPTATION_MODE: NORMAL
MDC_IDC_SET_LEADCHNL_RV_SENSING_POLARITY: NORMAL
MDC_IDC_SET_LEADCHNL_RV_SENSING_SENSITIVITY: 1.5 MV
MDC_IDC_SET_ZONE_DETECTION_INTERVAL: 375 MS
MDC_IDC_SET_ZONE_STATUS: NORMAL
MDC_IDC_SET_ZONE_TYPE: NORMAL
MDC_IDC_SET_ZONE_VENDOR_TYPE: NORMAL
MDC_IDC_STAT_AT_BURDEN_PERCENT: 0 %
MDC_IDC_STAT_AT_DTM_END: NORMAL
MDC_IDC_STAT_AT_DTM_START: NORMAL
MDC_IDC_STAT_BRADY_DTM_END: NORMAL
MDC_IDC_STAT_BRADY_DTM_START: NORMAL
MDC_IDC_STAT_BRADY_RA_PERCENT_PACED: 98 %
MDC_IDC_STAT_BRADY_RV_PERCENT_PACED: 1 %
MDC_IDC_STAT_EPISODE_RECENT_COUNT: 0
MDC_IDC_STAT_EPISODE_RECENT_COUNT_DTM_END: NORMAL
MDC_IDC_STAT_EPISODE_RECENT_COUNT_DTM_START: NORMAL
MDC_IDC_STAT_EPISODE_TYPE: NORMAL
MDC_IDC_STAT_EPISODE_VENDOR_TYPE: NORMAL
MDC_IDC_STAT_EPISODE_VENDOR_TYPE: NORMAL

## 2024-10-26 ENCOUNTER — APPOINTMENT (OUTPATIENT)
Dept: CT IMAGING | Facility: CLINIC | Age: 79
End: 2024-10-26
Attending: EMERGENCY MEDICINE
Payer: COMMERCIAL

## 2024-10-26 ENCOUNTER — HOSPITAL ENCOUNTER (EMERGENCY)
Facility: CLINIC | Age: 79
Discharge: HOME | End: 2024-10-26
Attending: EMERGENCY MEDICINE | Admitting: EMERGENCY MEDICINE
Payer: COMMERCIAL

## 2024-10-26 VITALS
BODY MASS INDEX: 27.04 KG/M2 | SYSTOLIC BLOOD PRESSURE: 123 MMHG | WEIGHT: 204 LBS | HEIGHT: 73 IN | DIASTOLIC BLOOD PRESSURE: 83 MMHG | OXYGEN SATURATION: 95 % | TEMPERATURE: 97.1 F | HEART RATE: 65 BPM | RESPIRATION RATE: 16 BRPM

## 2024-10-26 DIAGNOSIS — R51.9 NONINTRACTABLE EPISODIC HEADACHE, UNSPECIFIED HEADACHE TYPE: ICD-10-CM

## 2024-10-26 LAB
ERYTHROCYTE [SEDIMENTATION RATE] IN BLOOD BY WESTERGREN METHOD: 9 MM/HR (ref 0–20)
HOLD SPECIMEN: NORMAL

## 2024-10-26 PROCEDURE — 36415 COLL VENOUS BLD VENIPUNCTURE: CPT | Performed by: EMERGENCY MEDICINE

## 2024-10-26 PROCEDURE — 99284 EMERGENCY DEPT VISIT MOD MDM: CPT | Mod: 25

## 2024-10-26 PROCEDURE — 70450 CT HEAD/BRAIN W/O DYE: CPT

## 2024-10-26 PROCEDURE — 85652 RBC SED RATE AUTOMATED: CPT | Performed by: EMERGENCY MEDICINE

## 2024-10-26 PROCEDURE — 250N000013 HC RX MED GY IP 250 OP 250 PS 637: Performed by: EMERGENCY MEDICINE

## 2024-10-26 RX ORDER — GABAPENTIN 100 MG/1
CAPSULE ORAL
Qty: 47 CAPSULE | Refills: 0 | Status: SHIPPED | OUTPATIENT
Start: 2024-10-26 | End: 2024-11-11

## 2024-10-26 RX ORDER — ACETAMINOPHEN 500 MG
1000 TABLET ORAL ONCE
Status: COMPLETED | OUTPATIENT
Start: 2024-10-26 | End: 2024-10-26

## 2024-10-26 RX ADMIN — ACETAMINOPHEN 1000 MG: 500 TABLET ORAL at 08:22

## 2024-10-26 ASSESSMENT — COLUMBIA-SUICIDE SEVERITY RATING SCALE - C-SSRS
6. HAVE YOU EVER DONE ANYTHING, STARTED TO DO ANYTHING, OR PREPARED TO DO ANYTHING TO END YOUR LIFE?: NO
1. IN THE PAST MONTH, HAVE YOU WISHED YOU WERE DEAD OR WISHED YOU COULD GO TO SLEEP AND NOT WAKE UP?: NO
2. HAVE YOU ACTUALLY HAD ANY THOUGHTS OF KILLING YOURSELF IN THE PAST MONTH?: NO

## 2024-10-26 ASSESSMENT — ACTIVITIES OF DAILY LIVING (ADL)
ADLS_ACUITY_SCORE: 0
ADLS_ACUITY_SCORE: 0

## 2024-10-26 NOTE — DISCHARGE INSTRUCTIONS
Take tylenol 500 mg tablets, take 2 tablets three times a day as needed for headache  Watch for rash/shingles  If pain persists and becomes more episodic/zapping/electrical/grabbing, start gabapentin  Follow up with primary MD  Return to ED if worse or new symptoms

## 2024-10-26 NOTE — ED PROVIDER NOTES
"  Emergency Department Note      History of Present Illness     Chief Complaint   Headache      HPI   Itz Jaimes is a 79 year old male who presents with left-sided intermittent headache since approximately 1800 yesterday. Patient reports a \"sting\" of pain that feels like an increased pressure on his forehead. He denies sharp pain, pain on his bilateral temples or pain radiating to the posterior aspect of his head. Denies pain behind his left eye but reports pain above the eye. Patient denies history of headaches or similar symptoms. No rash, shingles, recent injury, trauma, recent illness, cough, fever, body aches, chills, pain when chewing, or change in sleep habits. Reports he has gotten the shingles vaccine. Patient has not taken anything for pain today.     Independent Historian   None    Review of External Notes   Clinic notes    Past Medical History     Medical History and Problem List   CKD stage 2  Degeneration of lumbar or lumbosacral intervertebral disc  Essential tremor  GERD  Hyperlipidemia  Hypothyroidism  Vertebrobasilar artery syndrome  Vitamin D deficiency    Medications   Eliquis  Asa  Tambocor  Synthroid  Prilosec  Mysoline  Inderal  Zocor  Topamax    Surgical History   Pacemaker and lead implant  IR PICC placement  Lumbar fusion of L3-S1  Bilateral total knee arthroplasty  Rotator cuff repair    Physical Exam     No data found.    Physical Exam  GENERAL: well developed, pleasant  HEAD: atraumatic. No herpetic lesions. No pain over temporal arteries.   EYES: pupils reactive, extraocular muscles intact, conjunctivae normal  ENT:  mucus membranes moist  NECK:  trachea midline, normal range of motion  RESPIRATORY: no tachypnea, breath sounds clear to auscultation   CVS: normal S1/S2, no murmurs, intact distal pulses  ABDOMEN: soft, nontender, nondistention  MUSCULOSKELETAL: no deformities  SKIN: warm and dry, no acute rashes or ulceration  NEURO: GCS 15, cranial nerves intact, alert and " "oriented x3  PSYCH:  Mood/affect normal    Diagnostics     Lab Results   Labs Ordered and Resulted from Time of ED Arrival to Time of ED Departure   ERYTHROCYTE SEDIMENTATION RATE AUTO - Normal       Result Value    Erythrocyte Sedimentation Rate 9       Imaging   CT Head w/o Contrast   Final Result   IMPRESSION:   1.  No acute intracranial process.        Independent Interpretation   CT head without bleed    ED Course      Medications Administered   Medications   acetaminophen (TYLENOL) tablet 1,000 mg (1,000 mg Oral $Given 10/26/24 0822)     Procedures   Procedures     Discussion of Management   None    ED Course   ED Course as of 10/29/24 0853   Sat Oct 26, 2024   0803 I obtained history and examined the patient as noted above.     0906 I rechecked the patient and explained findings. We discussed plan for discharge and patient is in agreement with plan.        Additional Documentation  None    Medical Decision Making / Diagnosis     CMS Diagnoses: None    MIPS       None    MDM   Itz Jaimes is a 79 year old male presents with headache.  He notes no history of headache and describes it as grabbing on \"coming on\" and then leaving.  Trigeminal neuralgia seems most likely.  Sed rate is normal and doesn't fit with temporal arteritis.  No rash noted yet for shingles.  CT head is normal.  Discussed normal findings and to watch for a rash and if symptoms persist neurontin and follow up.    Disposition   The patient was discharged.     Diagnosis     ICD-10-CM    1. Nonintractable episodic headache, unspecified headache type  R51.9            Discharge Medications   Discharge Medication List as of 10/26/2024  9:11 AM        START taking these medications    Details   gabapentin (NEURONTIN) 100 MG capsule Take 1 capsule (100 mg) by mouth 3 times daily for 1 day, THEN 1 capsule (100 mg) 2 times daily for 1 day, THEN 1 capsule (100 mg) 3 times daily for 14 days., Disp-47 capsule, R-0, E-Prescribe           Scribe " Disclosure:  I, Valeriadani Gonzalez, am serving as a scribe at 8:04 AM on 10/26/2024 to document services personally performed by Jose Carlos Canela MD based on my observations and the provider's statements to me.        Jose Carlos Canela MD  10/29/24 0813

## 2024-10-26 NOTE — ED TRIAGE NOTES
Patient presents with complaints of an intermittent left frontal headache that started last evening around 1800. Patient denied any other symptoms associated with the headache and does not appear to have any focal deficits on initial assessment. Patient is on Eliquis.     Triage Assessment (Adult)       Row Name 10/26/24 0756          Triage Assessment    Airway WDL WDL        Respiratory WDL    Respiratory WDL WDL        Skin Circulation/Temperature WDL    Skin Circulation/Temperature WDL WDL        Cardiac WDL    Cardiac WDL WDL        Peripheral/Neurovascular WDL    Peripheral Neurovascular WDL WDL        Cognitive/Neuro/Behavioral WDL    Cognitive/Neuro/Behavioral WDL WDL

## 2025-02-26 ENCOUNTER — ANCILLARY PROCEDURE (OUTPATIENT)
Dept: CARDIOLOGY | Facility: CLINIC | Age: 80
End: 2025-02-26
Attending: INTERNAL MEDICINE
Payer: COMMERCIAL

## 2025-02-26 DIAGNOSIS — I49.5 SICK SINUS SYNDROME (H): Primary | ICD-10-CM

## 2025-02-26 DIAGNOSIS — Z95.0 CARDIAC PACEMAKER IN SITU: ICD-10-CM

## 2025-02-26 DIAGNOSIS — I49.5 SICK SINUS SYNDROME (H): ICD-10-CM

## 2025-02-26 DIAGNOSIS — I48.0 PAROXYSMAL ATRIAL FIBRILLATION (H): ICD-10-CM

## 2025-02-26 LAB
MDC_IDC_EPISODE_DTM: NORMAL
MDC_IDC_EPISODE_ID: NORMAL
MDC_IDC_EPISODE_TYPE: NORMAL
MDC_IDC_LEAD_CONNECTION_STATUS: NORMAL
MDC_IDC_LEAD_CONNECTION_STATUS: NORMAL
MDC_IDC_LEAD_IMPLANT_DT: NORMAL
MDC_IDC_LEAD_IMPLANT_DT: NORMAL
MDC_IDC_LEAD_LOCATION: NORMAL
MDC_IDC_LEAD_LOCATION: NORMAL
MDC_IDC_LEAD_LOCATION_DETAIL_1: NORMAL
MDC_IDC_LEAD_LOCATION_DETAIL_1: NORMAL
MDC_IDC_LEAD_MFG: NORMAL
MDC_IDC_LEAD_MFG: NORMAL
MDC_IDC_LEAD_MODEL: NORMAL
MDC_IDC_LEAD_MODEL: NORMAL
MDC_IDC_LEAD_POLARITY_TYPE: NORMAL
MDC_IDC_LEAD_POLARITY_TYPE: NORMAL
MDC_IDC_LEAD_SERIAL: NORMAL
MDC_IDC_LEAD_SERIAL: NORMAL
MDC_IDC_MSMT_BATTERY_DTM: NORMAL
MDC_IDC_MSMT_BATTERY_REMAINING_LONGEVITY: 156 MO
MDC_IDC_MSMT_BATTERY_REMAINING_PERCENTAGE: 100 %
MDC_IDC_MSMT_BATTERY_STATUS: NORMAL
MDC_IDC_MSMT_LEADCHNL_RA_IMPEDANCE_VALUE: 723 OHM
MDC_IDC_MSMT_LEADCHNL_RA_PACING_THRESHOLD_AMPLITUDE: 1 V
MDC_IDC_MSMT_LEADCHNL_RA_PACING_THRESHOLD_PULSEWIDTH: 0.4 MS
MDC_IDC_MSMT_LEADCHNL_RV_IMPEDANCE_VALUE: 604 OHM
MDC_IDC_MSMT_LEADCHNL_RV_PACING_THRESHOLD_AMPLITUDE: 0.9 V
MDC_IDC_MSMT_LEADCHNL_RV_PACING_THRESHOLD_PULSEWIDTH: 0.4 MS
MDC_IDC_PG_IMPLANT_DTM: NORMAL
MDC_IDC_PG_MFG: NORMAL
MDC_IDC_PG_MODEL: NORMAL
MDC_IDC_PG_SERIAL: NORMAL
MDC_IDC_PG_TYPE: NORMAL
MDC_IDC_SESS_CLINIC_NAME: NORMAL
MDC_IDC_SESS_DTM: NORMAL
MDC_IDC_SESS_TYPE: NORMAL
MDC_IDC_SET_BRADY_AT_MODE_SWITCH_MODE: NORMAL
MDC_IDC_SET_BRADY_AT_MODE_SWITCH_RATE: 170 {BEATS}/MIN
MDC_IDC_SET_BRADY_LOWRATE: 60 {BEATS}/MIN
MDC_IDC_SET_BRADY_MAX_SENSOR_RATE: 130 {BEATS}/MIN
MDC_IDC_SET_BRADY_MAX_TRACKING_RATE: 130 {BEATS}/MIN
MDC_IDC_SET_BRADY_MODE: NORMAL
MDC_IDC_SET_BRADY_PAV_DELAY_HIGH: 200 MS
MDC_IDC_SET_BRADY_PAV_DELAY_LOW: 300 MS
MDC_IDC_SET_BRADY_SAV_DELAY_HIGH: 200 MS
MDC_IDC_SET_BRADY_SAV_DELAY_LOW: 300 MS
MDC_IDC_SET_LEADCHNL_RA_PACING_AMPLITUDE: 2 V
MDC_IDC_SET_LEADCHNL_RA_PACING_CAPTURE_MODE: NORMAL
MDC_IDC_SET_LEADCHNL_RA_PACING_POLARITY: NORMAL
MDC_IDC_SET_LEADCHNL_RA_PACING_PULSEWIDTH: 0.4 MS
MDC_IDC_SET_LEADCHNL_RA_SENSING_ADAPTATION_MODE: NORMAL
MDC_IDC_SET_LEADCHNL_RA_SENSING_POLARITY: NORMAL
MDC_IDC_SET_LEADCHNL_RA_SENSING_SENSITIVITY: 0.25 MV
MDC_IDC_SET_LEADCHNL_RV_PACING_AMPLITUDE: 1.4 V
MDC_IDC_SET_LEADCHNL_RV_PACING_CAPTURE_MODE: NORMAL
MDC_IDC_SET_LEADCHNL_RV_PACING_POLARITY: NORMAL
MDC_IDC_SET_LEADCHNL_RV_PACING_PULSEWIDTH: 0.4 MS
MDC_IDC_SET_LEADCHNL_RV_SENSING_ADAPTATION_MODE: NORMAL
MDC_IDC_SET_LEADCHNL_RV_SENSING_POLARITY: NORMAL
MDC_IDC_SET_LEADCHNL_RV_SENSING_SENSITIVITY: 1.5 MV
MDC_IDC_SET_ZONE_DETECTION_INTERVAL: 375 MS
MDC_IDC_SET_ZONE_STATUS: NORMAL
MDC_IDC_SET_ZONE_TYPE: NORMAL
MDC_IDC_SET_ZONE_VENDOR_TYPE: NORMAL
MDC_IDC_STAT_AT_BURDEN_PERCENT: 1 %
MDC_IDC_STAT_AT_DTM_END: NORMAL
MDC_IDC_STAT_AT_DTM_START: NORMAL
MDC_IDC_STAT_BRADY_DTM_END: NORMAL
MDC_IDC_STAT_BRADY_DTM_START: NORMAL
MDC_IDC_STAT_BRADY_RA_PERCENT_PACED: 97 %
MDC_IDC_STAT_BRADY_RV_PERCENT_PACED: 1 %
MDC_IDC_STAT_EPISODE_RECENT_COUNT: 0
MDC_IDC_STAT_EPISODE_RECENT_COUNT_DTM_END: NORMAL
MDC_IDC_STAT_EPISODE_RECENT_COUNT_DTM_START: NORMAL
MDC_IDC_STAT_EPISODE_TYPE: NORMAL
MDC_IDC_STAT_EPISODE_VENDOR_TYPE: NORMAL
MDC_IDC_STAT_EPISODE_VENDOR_TYPE: NORMAL

## 2025-02-26 PROCEDURE — 93296 REM INTERROG EVL PM/IDS: CPT | Performed by: INTERNAL MEDICINE

## 2025-02-26 PROCEDURE — 93294 REM INTERROG EVL PM/LDLS PM: CPT | Performed by: INTERNAL MEDICINE

## 2025-03-19 DIAGNOSIS — I48.91 ATRIAL FIBRILLATION WITH RVR (H): ICD-10-CM

## 2025-03-19 RX ORDER — ELECTROLYTES/DEXTROSE
64 SOLUTION, ORAL ORAL DAILY
Qty: 180 TABLET | Refills: 1 | Status: SHIPPED | OUTPATIENT
Start: 2025-03-19

## 2025-03-19 RX ORDER — FLECAINIDE ACETATE 50 MG/1
50 TABLET ORAL EVERY 12 HOURS
Qty: 180 TABLET | Refills: 1 | Status: SHIPPED | OUTPATIENT
Start: 2025-03-19

## 2025-05-02 ENCOUNTER — APPOINTMENT (OUTPATIENT)
Dept: URBAN - METROPOLITAN AREA CLINIC 255 | Age: 80
Setting detail: DERMATOLOGY
End: 2025-05-02

## 2025-05-02 VITALS — HEIGHT: 72.5 IN | WEIGHT: 216 LBS

## 2025-05-02 DIAGNOSIS — D22 MELANOCYTIC NEVI: ICD-10-CM

## 2025-05-02 DIAGNOSIS — D18.0 HEMANGIOMA: ICD-10-CM

## 2025-05-02 DIAGNOSIS — Z71.89 OTHER SPECIFIED COUNSELING: ICD-10-CM

## 2025-05-02 DIAGNOSIS — L82.1 OTHER SEBORRHEIC KERATOSIS: ICD-10-CM

## 2025-05-02 DIAGNOSIS — L57.8 OTHER SKIN CHANGES DUE TO CHRONIC EXPOSURE TO NONIONIZING RADIATION: ICD-10-CM

## 2025-05-02 DIAGNOSIS — L57.0 ACTINIC KERATOSIS: ICD-10-CM

## 2025-05-02 DIAGNOSIS — Z85.828 PERSONAL HISTORY OF OTHER MALIGNANT NEOPLASM OF SKIN: ICD-10-CM

## 2025-05-02 DIAGNOSIS — Z86.007 PERSONAL HISTORY OF IN-SITU NEOPLASM OF SKIN: ICD-10-CM

## 2025-05-02 PROBLEM — D18.01 HEMANGIOMA OF SKIN AND SUBCUTANEOUS TISSUE: Status: ACTIVE | Noted: 2025-05-02

## 2025-05-02 PROBLEM — D22.62 MELANOCYTIC NEVI OF LEFT UPPER LIMB, INCLUDING SHOULDER: Status: ACTIVE | Noted: 2025-05-02

## 2025-05-02 PROBLEM — D22.72 MELANOCYTIC NEVI OF LEFT LOWER LIMB, INCLUDING HIP: Status: ACTIVE | Noted: 2025-05-02

## 2025-05-02 PROBLEM — D22.61 MELANOCYTIC NEVI OF RIGHT UPPER LIMB, INCLUDING SHOULDER: Status: ACTIVE | Noted: 2025-05-02

## 2025-05-02 PROBLEM — D22.71 MELANOCYTIC NEVI OF RIGHT LOWER LIMB, INCLUDING HIP: Status: ACTIVE | Noted: 2025-05-02

## 2025-05-02 PROBLEM — D22.5 MELANOCYTIC NEVI OF TRUNK: Status: ACTIVE | Noted: 2025-05-02

## 2025-05-02 PROCEDURE — 17000 DESTRUCT PREMALG LESION: CPT

## 2025-05-02 PROCEDURE — 99213 OFFICE O/P EST LOW 20 MIN: CPT | Mod: 25

## 2025-05-02 PROCEDURE — OTHER COUNSELING: OTHER

## 2025-05-02 PROCEDURE — OTHER LIQUID NITROGEN: OTHER

## 2025-05-02 PROCEDURE — OTHER PATIENT SPECIFIC COUNSELING: OTHER

## 2025-05-02 PROCEDURE — OTHER SEPARATE AND IDENTIFIABLE DOCUMENTATION: OTHER

## 2025-05-02 PROCEDURE — OTHER MIPS QUALITY: OTHER

## 2025-05-02 PROCEDURE — OTHER DIAGNOSIS COMMENT: OTHER

## 2025-05-02 PROCEDURE — OTHER SUNSCREEN RECOMMENDATIONS: OTHER

## 2025-05-02 PROCEDURE — 17003 DESTRUCT PREMALG LES 2-14: CPT

## 2025-05-02 ASSESSMENT — LOCATION DETAILED DESCRIPTION DERM
LOCATION DETAILED: RIGHT VENTRAL PROXIMAL FOREARM
LOCATION DETAILED: RIGHT ANTERIOR PROXIMAL THIGH
LOCATION DETAILED: LEFT ANTECUBITAL SKIN
LOCATION DETAILED: LEFT DISTAL ULNAR DORSAL FOREARM
LOCATION DETAILED: LEFT INFERIOR CENTRAL MALAR CHEEK
LOCATION DETAILED: LEFT DISTAL DORSAL FOREARM
LOCATION DETAILED: RIGHT DISTAL DORSAL FOREARM
LOCATION DETAILED: EPIGASTRIC SKIN
LOCATION DETAILED: LEFT INFERIOR LATERAL MIDBACK
LOCATION DETAILED: LEFT ANTERIOR PROXIMAL THIGH
LOCATION DETAILED: LEFT VENTRAL PROXIMAL FOREARM
LOCATION DETAILED: RIGHT ANTERIOR DISTAL THIGH
LOCATION DETAILED: LEFT CENTRAL MALAR CHEEK
LOCATION DETAILED: RIGHT SUPERIOR HELIX
LOCATION DETAILED: RIGHT INFERIOR LATERAL NECK
LOCATION DETAILED: INFERIOR THORACIC SPINE
LOCATION DETAILED: LEFT VENTRAL DISTAL FOREARM
LOCATION DETAILED: MIDDLE STERNUM
LOCATION DETAILED: RIGHT PROXIMAL DORSAL FOREARM
LOCATION DETAILED: RIGHT VENTRAL DISTAL FOREARM
LOCATION DETAILED: LEFT ANTERIOR DISTAL THIGH
LOCATION DETAILED: RIGHT CENTRAL MALAR CHEEK
LOCATION DETAILED: LEFT MEDIAL UPPER BACK
LOCATION DETAILED: LEFT LATERAL TRAPEZIAL NECK

## 2025-05-02 ASSESSMENT — LOCATION ZONE DERM
LOCATION ZONE: ARM
LOCATION ZONE: FACE
LOCATION ZONE: NECK
LOCATION ZONE: EAR
LOCATION ZONE: LEG
LOCATION ZONE: TRUNK

## 2025-05-02 ASSESSMENT — LOCATION SIMPLE DESCRIPTION DERM
LOCATION SIMPLE: LEFT FOREARM
LOCATION SIMPLE: RIGHT CHEEK
LOCATION SIMPLE: LEFT UPPER BACK
LOCATION SIMPLE: CHEST
LOCATION SIMPLE: POSTERIOR NECK
LOCATION SIMPLE: ABDOMEN
LOCATION SIMPLE: RIGHT EAR
LOCATION SIMPLE: UPPER BACK
LOCATION SIMPLE: LEFT UPPER ARM
LOCATION SIMPLE: LEFT LOWER BACK
LOCATION SIMPLE: RIGHT FOREARM
LOCATION SIMPLE: LEFT CHEEK
LOCATION SIMPLE: LEFT THIGH
LOCATION SIMPLE: RIGHT THIGH

## 2025-05-29 DIAGNOSIS — Z95.0 CARDIAC PACEMAKER IN SITU: Primary | ICD-10-CM

## 2025-07-20 NOTE — PROGRESS NOTES
"[facility]    I had the pleasure of seeing Itz when he came for follow up of paroxysmal AFib and SND.  This 80 year old sees Dr. Parisi for his history of:    HTN  Paroxysmal AFib, SND - AFib first noted 2/2024 and started on flecainide. Developed SB and opted to proceed with dual chamber [employer] PPM 3/29/2024   Dyslipidemia  Hypothyroidism  Essential tremors - improved on propranolol  Vertebrobasilar Artery Occlusion      Last Visit & Interval History:  Dr. Parisi last saw Itz 7/2024 at which time he was doing well following PPM implantation. He'd not had recurrent AFib. He remained active working at the [employer].     Today's Visit:  Itz is doing well! Remains somewhat SOB with longer walks, which he thinks is likely d/t not exercising as much due to his wife's mobility issues. No c/o CP, pressure, tightness.    No change in chronic LE edema. No orthopnea, PND.     Continues with bilateral hand tremors, which he's had since childhood.    VITALS:  Vitals: /80   Pulse 60   Ht 1.854 m (6' 1\")   Wt 103 kg (227 lb)   BMI 29.95 kg/m      Diagnostic Testing:  EKG today, which I overread, showed ApVs (or SR) 60 bpm with QRS 95 ms on flecainide 50 mg BID and Propranolol  mg BID  Device interrogation 5/2025, showed 97%AP and <1%P in DDDR 60/130. HR 80s per histogram. Rare response adjusted. 1 mode switch <1 minute  Echocardiogram 2/2024 - EF 55-60%. 2+MR. Mild PH    Plan:  Annual follow up with in-office device check ~5/2026      Assessment/Plan:    Paroxysmal AFib, SND  As above, s/p dual chamber [employer] PPM 3/2024  Remains on flecainide 50 mg BID and propranolol  (for tremor)  EKG today with SR (maybe APVS?) with acceptable QRS on flecainide   Device check 5/2025 showed mode switching < 1 minute    On AC with Eliquis 5 mg BID. Dose appropriate for age/weight/C (1.3 in 4/2025)r. Hgb wnl 14.2g/dL 4/2025    PLAN:  Will continue flecainide therapy as doing well " based on symptoms and Device checks  Continue AC  Annual follow up with in-office device check 5/2026    HTN  Remains on propranolol 160 mg BID  BP high initially - improved when I rechecked  Checked against home device and it was about equal    PLAN:  Continue current medications        Nicki Winston PA-C, MSPAS      Orders Placed This Encounter   Procedures    EKG 12-lead complete w/read - Clinics (performed today)     No orders of the defined types were placed in this encounter.    There are no discontinued medications.      Encounter Diagnoses   Name Primary?    Cardiac pacemaker in situ     Paroxysmal atrial fibrillation (H)        CURRENT MEDICATIONS:  Current Outpatient Medications   Medication Sig Dispense Refill    apixaban ANTICOAGULANT (ELIQUIS) 5 MG tablet Take 1 tablet (5 mg) by mouth 2 times daily. 180 tablet 1    aspirin 81 MG EC tablet Take 81 mg by mouth daily      calcium carbonate (OS-LORRIE) 1500 (600 Ca) MG tablet Take 600 mg by mouth 2 times daily At 1200/2000      flecainide (TAMBOCOR) 50 MG tablet Take 1 tablet (50 mg) by mouth every 12 hours. 180 tablet 1    levothyroxine (SYNTHROID/LEVOTHROID) 112 MCG tablet Take 112 mcg by mouth daily      loratadine (CLARITIN) 10 MG tablet Take 10 mg by mouth daily      Magnesium Chloride 64 MG TABS Take 64 mg by mouth daily. Take 2 tablets by mouth daily. 180 tablet 1    omega 3 1000 MG CAPS Take 1,000 mg by mouth 2 times daily At AM, 2000      omeprazole (PRILOSEC) 20 MG DR capsule Take 20 mg by mouth 2 times daily At 1200, 2000      propranolol ER (INDERAL LA) 160 MG 24 hr capsule Take 160 mg by mouth 2 times daily At AM, 1600      simvastatin (ZOCOR) 40 MG tablet Take 40 mg by mouth daily      vitamin D3 (CHOLECALCIFEROL) 50 mcg (2000 units) tablet Take 1 tablet by mouth daily      gabapentin (NEURONTIN) 100 MG capsule Take 1 capsule (100 mg) by mouth 3 times daily for 1 day, THEN 1 capsule (100 mg) 2 times daily for 1 day, THEN 1 capsule (100 mg) 3 times  "daily for 14 days. (Patient not taking: No sig reported) 47 capsule 0    primidone (MYSOLINE) 50 MG tablet Take 150 mg by mouth 2 times daily At AM, 2000 (Patient not taking: Reported on 7/23/2024)      topiramate (TOPAMAX) 25 MG tablet Take 25 mg by mouth daily (Patient not taking: Reported on 7/23/2025)         ALLERGIES   No Known Allergies      Review of Systems:  Skin:        Eyes:       ENT:       Respiratory:  Negative for shortness of breath, dyspnea on exertion, cough  Cardiovascular:  Negative for, palpitations, chest pain, exercise intolerance, lightheadedness, fatigue, dizziness edema, Positive for  Gastroenterology:      Genitourinary:       Musculoskeletal:       Neurologic:  Positive for tremors  Psychiatric:       Heme/Lymph/Imm:  Negative    Endocrine:  Negative thyroid disorder, diabetes    Physical Exam:  Vitals: /80   Pulse 60   Ht 1.854 m (6' 1\")   Wt 103 kg (227 lb)   BMI 29.95 kg/m      Constitutional:  cooperative, alert and oriented, well developed, well nourished, in no acute distress        Skin:  warm and dry to the touch, no apparent skin lesions or masses noted        Head:  normocephalic, no masses or lesions        Eyes:  pupils equal and round, conjunctivae and lids unremarkable, sclera white        ENT:  no pallor or cyanosis, dentition good        Neck:  JVP normal, no carotid bruit, no stiffness        Chest:  normal breath sounds, clear to auscultation, normal A-P diameter, normal symmetry, normal respiratory excursion, no use of accessory muscles        Cardiac: regular rhythm, no murmurs, gallops or rubs detected                  Abdomen:  abdomen soft        Vascular: pulses full and equal                                      Extremities and Back:  no deformities, clubbing, cyanosis, erythema observed        Neurological:  no gross motor deficits            PAST MEDICAL HISTORY:  Past Medical History:   Diagnosis Date    Chronic kidney disease, stage 2 (mild)     " Degeneration of lumbar or lumbosacral intervertebral disc     Essential tremor     GERD (gastroesophageal reflux disease)     Hyperlipidemia     Hypertension     Hypothyroidism     Vertebrobasilar artery syndrome     Vitamin D deficiency        PAST SURGICAL HISTORY:  Past Surgical History:   Procedure Laterality Date    EP PACEMAKER DEVICE & LEAD IMPLANT- RIGHT ATRIAL & RIGHT VENTRICULAR N/A 3/29/2024    Procedure: Pacemaker Device & Lead Implant - Right Atrial & Right Ventricular;  Surgeon: Farhad Parisi MD;  Location:  HEART CARDIAC CATH LAB    IR PICC PLACEMENT > 5 YRS OF AGE  3/3/2017    LUMBAR FUSION      L3-S1    REPLACEMENT TOTAL KNEE Bilateral     ROTATOR CUFF REPAIR RT/LT         FAMILY HISTORY:  Family History   Problem Relation Age of Onset    Hypertension Mother     Prostate Cancer Father     Hypertension Sister        SOCIAL HISTORY:  Social History     Socioeconomic History    Marital status:      Spouse name: None    Number of children: None    Years of education: None    Highest education level: None   Tobacco Use    Smoking status: Never    Smokeless tobacco: Never   Substance and Sexual Activity    Alcohol use: Yes     Comment: Occasional     Social Drivers of Health     Financial Resource Strain: Low Risk  (7/18/2022)    Received from [email or web address]    Financial Resource Strain     Difficulty of Paying Living Expenses: 3   Food Insecurity: No Food Insecurity (7/18/2022)    Received from [email or web address]    Food Insecurity     Worried About Running Out of Food in the Last Year: 1   Transportation Needs: No Transportation Needs (7/18/2022)    Received from [email or web address]    Transportation Needs     Lack of Transportation (Medical): 1    [facility]    Social Connections   Housing Stability: Low Risk  (7/18/2022)    Received from [facility]    Housing Stability     Unable to Pay for Housing in the Last Year: 1

## 2025-07-23 ENCOUNTER — OFFICE VISIT (OUTPATIENT)
Dept: CARDIOLOGY | Facility: CLINIC | Age: 80
End: 2025-07-23
Attending: INTERNAL MEDICINE
Payer: COMMERCIAL

## 2025-07-23 VITALS
SYSTOLIC BLOOD PRESSURE: 138 MMHG | WEIGHT: 227 LBS | BODY MASS INDEX: 30.09 KG/M2 | DIASTOLIC BLOOD PRESSURE: 80 MMHG | HEIGHT: 73 IN | HEART RATE: 60 BPM

## 2025-07-23 DIAGNOSIS — Z95.0 CARDIAC PACEMAKER IN SITU: ICD-10-CM

## 2025-07-23 DIAGNOSIS — I48.0 PAROXYSMAL ATRIAL FIBRILLATION (H): ICD-10-CM

## 2025-07-23 PROCEDURE — 93000 ELECTROCARDIOGRAM COMPLETE: CPT | Performed by: PHYSICIAN ASSISTANT

## 2025-07-23 PROCEDURE — 3075F SYST BP GE 130 - 139MM HG: CPT | Performed by: PHYSICIAN ASSISTANT

## 2025-07-23 PROCEDURE — 99213 OFFICE O/P EST LOW 20 MIN: CPT | Performed by: PHYSICIAN ASSISTANT

## 2025-07-23 PROCEDURE — 3079F DIAST BP 80-89 MM HG: CPT | Performed by: PHYSICIAN ASSISTANT

## 2025-07-23 NOTE — PATIENT INSTRUCTIONS
Itz - it was nice to see you today!    Today we reviewed:    Device check 5/2025 looked good!  EKG today showed normal intervals on the flecainide     MEDICATION CHANGES:    NONE    RECOMMENDATIONS:    Let us know if any issues/questions    FOLLOW UP:    Annual follow-up with in-office device check     IMPORTANT PHONE NUMBERS FOR [facility] ([place]):    Device nurses: [phone]

## 2025-07-23 NOTE — LETTER
"7/23/2025    Bony Avila MD  [address] [facility] [address]    RE: Itz Zeke Fiona       Dear Colleague,     I had the pleasure of seeing Itz Jaimes in the [facility].  [facility]    I had the pleasure of seeing Itz when he came for follow up of paroxysmal AFib and SND.  This 80 year old sees Dr. Parisi for his history of:    HTN  Paroxysmal AFib, SND - AFib first noted 2/2024 and started on flecainide. Developed SB and opted to proceed with dual chamber [employer] PPM 3/29/2024   Dyslipidemia  Hypothyroidism  Essential tremors - improved on propranolol  Vertebrobasilar Artery Occlusion      Last Visit & Interval History:  Dr. Parisi last saw Itz 7/2024 at which time he was doing well following PPM implantation. He'd not had recurrent AFib. He remained active working at the [employer].     Today's Visit:  Itz is doing well! Remains somewhat SOB with longer walks, which he thinks is likely d/t not exercising as much due to his wife's mobility issues. No c/o CP, pressure, tightness.    No change in chronic LE edema. No orthopnea, PND.     Continues with bilateral hand tremors, which he's had since childhood.    VITALS:  Vitals: /80   Pulse 60   Ht 1.854 m (6' 1\")   Wt 103 kg (227 lb)   BMI 29.95 kg/m      Diagnostic Testing:  EKG today, which I overread, showed ApVs (or SR) 60 bpm with QRS 95 ms on flecainide 50 mg BID and Propranolol  mg BID  Device interrogation 5/2025, showed 97%AP and <1%P in DDDR 60/130. HR 80s per histogram. Rare response adjusted. 1 mode switch <1 minute  Echocardiogram 2/2024 - EF 55-60%. 2+MR. Mild PH    Plan:  Annual follow up with in-office device check ~5/2026      Assessment/Plan:    Paroxysmal AFib, SND  As above, s/p dual chamber [employer] PPM 3/2024  Remains on flecainide 50 mg BID and propranolol  (for tremor)  EKG today with SR (maybe APVS?) with acceptable QRS on flecainide "   Device check 5/2025 showed mode switching < 1 minute    On AC with Eliquis 5 mg BID. Dose appropriate for age/weight/C (1.3 in 4/2025)r. Hgb wnl 14.2g/dL 4/2025    PLAN:  Will continue flecainide therapy as doing well based on symptoms and Device checks  Continue AC  Annual follow up with in-office device check 5/2026    HTN  Remains on propranolol 160 mg BID  BP high initially - improved when I rechecked  Checked against home device and it was about equal    PLAN:  Continue current medications        Nicki Winston PA-C, MSPAS      Orders Placed This Encounter   Procedures     EKG 12-lead complete w/read - Clinics (performed today)     No orders of the defined types were placed in this encounter.    There are no discontinued medications.      Encounter Diagnoses   Name Primary?     Cardiac pacemaker in situ      Paroxysmal atrial fibrillation (H)        CURRENT MEDICATIONS:  Current Outpatient Medications   Medication Sig Dispense Refill     apixaban ANTICOAGULANT (ELIQUIS) 5 MG tablet Take 1 tablet (5 mg) by mouth 2 times daily. 180 tablet 1     aspirin 81 MG EC tablet Take 81 mg by mouth daily       calcium carbonate (OS-LORRIE) 1500 (600 Ca) MG tablet Take 600 mg by mouth 2 times daily At 1200/2000       flecainide (TAMBOCOR) 50 MG tablet Take 1 tablet (50 mg) by mouth every 12 hours. 180 tablet 1     levothyroxine (SYNTHROID/LEVOTHROID) 112 MCG tablet Take 112 mcg by mouth daily       loratadine (CLARITIN) 10 MG tablet Take 10 mg by mouth daily       Magnesium Chloride 64 MG TABS Take 64 mg by mouth daily. Take 2 tablets by mouth daily. 180 tablet 1     omega 3 1000 MG CAPS Take 1,000 mg by mouth 2 times daily At AM, 2000       omeprazole (PRILOSEC) 20 MG DR capsule Take 20 mg by mouth 2 times daily At 1200, 2000       propranolol ER (INDERAL LA) 160 MG 24 hr capsule Take 160 mg by mouth 2 times daily At AM, 1600       simvastatin (ZOCOR) 40 MG tablet Take 40 mg by mouth daily       vitamin D3 (CHOLECALCIFEROL) 50  "mcg (2000 units) tablet Take 1 tablet by mouth daily       gabapentin (NEURONTIN) 100 MG capsule Take 1 capsule (100 mg) by mouth 3 times daily for 1 day, THEN 1 capsule (100 mg) 2 times daily for 1 day, THEN 1 capsule (100 mg) 3 times daily for 14 days. (Patient not taking: No sig reported) 47 capsule 0     primidone (MYSOLINE) 50 MG tablet Take 150 mg by mouth 2 times daily At AM, 2000 (Patient not taking: Reported on 7/23/2024)       topiramate (TOPAMAX) 25 MG tablet Take 25 mg by mouth daily (Patient not taking: Reported on 7/23/2025)         ALLERGIES   No Known Allergies      Review of Systems:  Skin:        Eyes:       ENT:       Respiratory:  Negative for shortness of breath, dyspnea on exertion, cough  Cardiovascular:  Negative for, palpitations, chest pain, exercise intolerance, lightheadedness, fatigue, dizziness edema, Positive for  Gastroenterology:      Genitourinary:       Musculoskeletal:       Neurologic:  Positive for tremors  Psychiatric:       Heme/Lymph/Imm:  Negative    Endocrine:  Negative thyroid disorder, diabetes    Physical Exam:  Vitals: /80   Pulse 60   Ht 1.854 m (6' 1\")   Wt 103 kg (227 lb)   BMI 29.95 kg/m      Constitutional:  cooperative, alert and oriented, well developed, well nourished, in no acute distress        Skin:  warm and dry to the touch, no apparent skin lesions or masses noted        Head:  normocephalic, no masses or lesions        Eyes:  pupils equal and round, conjunctivae and lids unremarkable, sclera white        ENT:  no pallor or cyanosis, dentition good        Neck:  JVP normal, no carotid bruit, no stiffness        Chest:  normal breath sounds, clear to auscultation, normal A-P diameter, normal symmetry, normal respiratory excursion, no use of accessory muscles        Cardiac: regular rhythm, no murmurs, gallops or rubs detected                  Abdomen:  abdomen soft        Vascular: pulses full and equal                                  "     Extremities and Back:  no deformities, clubbing, cyanosis, erythema observed        Neurological:  no gross motor deficits            PAST MEDICAL HISTORY:  Past Medical History:   Diagnosis Date     Chronic kidney disease, stage 2 (mild)      Degeneration of lumbar or lumbosacral intervertebral disc      Essential tremor      GERD (gastroesophageal reflux disease)      Hyperlipidemia      Hypertension      Hypothyroidism      Vertebrobasilar artery syndrome      Vitamin D deficiency        PAST SURGICAL HISTORY:  Past Surgical History:   Procedure Laterality Date     EP PACEMAKER DEVICE & LEAD IMPLANT- RIGHT ATRIAL & RIGHT VENTRICULAR N/A 3/29/2024    Procedure: Pacemaker Device & Lead Implant - Right Atrial & Right Ventricular;  Surgeon: Farhad Parisi MD;  Location:  HEART CARDIAC CATH LAB     IR PICC PLACEMENT > 5 YRS OF AGE  3/3/2017     LUMBAR FUSION      L3-S1     REPLACEMENT TOTAL KNEE Bilateral      ROTATOR CUFF REPAIR RT/LT         FAMILY HISTORY:  Family History   Problem Relation Age of Onset     Hypertension Mother      Prostate Cancer Father      Hypertension Sister        SOCIAL HISTORY:  Social History     Socioeconomic History     Marital status:      Spouse name: None     Number of children: None     Years of education: None     Highest education level: None   Tobacco Use     Smoking status: Never     Smokeless tobacco: Never   Substance and Sexual Activity     Alcohol use: Yes     Comment: Occasional     Social Drivers of Health     Financial Resource Strain: Low Risk  (7/18/2022)    Received from [facility]    Financial Resource Strain      Difficulty of Paying Living Expenses: 3   Food Insecurity: No Food Insecurity (7/18/2022)    Received from [facility]    Food Insecurity      Worried About Running Out of Food in the Last Year: 1   Transportation Needs: No Transportation Needs (7/18/2022)    Received from [employer]  [facility]    Transportation Needs      Lack of Transportation (Medical): 1    Received from [facility]    Social Connections   Housing Stability: Low Risk  (7/18/2022)    Received from [facility]    Housing Stability      Unable to Pay for Housing in the Last Year: 1           Thank you for allowing me to participate in the care of your patient.      Sincerely,     Tawnya Winston PA-C     [facility]  cc:   Farhad Parisi MD  [address] [place] [address]

## (undated) DEVICE — RAD INTRODUCER KIT MICRO 5FRX10CM .018 NITINOL G/W

## (undated) DEVICE — PACK PCMKR PERM SRG PROC LF SAN32PC573

## (undated) DEVICE — SHEATH PRELUDE SNAP 13CM 6FR

## (undated) DEVICE — CABLE PACING ALLIGATOR CLIP 301-CG

## (undated) DEVICE — DEFIB PRO-PADZ LVP LQD GEL ADULT 8900-2105-01

## (undated) RX ORDER — ACETAMINOPHEN 325 MG/1
TABLET ORAL
Status: DISPENSED
Start: 2024-03-29

## (undated) RX ORDER — LIDOCAINE HYDROCHLORIDE 10 MG/ML
INJECTION, SOLUTION EPIDURAL; INFILTRATION; INTRACAUDAL; PERINEURAL
Status: DISPENSED
Start: 2024-03-29

## (undated) RX ORDER — FENTANYL CITRATE 50 UG/ML
INJECTION, SOLUTION INTRAMUSCULAR; INTRAVENOUS
Status: DISPENSED
Start: 2024-03-29

## (undated) RX ORDER — BUPIVACAINE HYDROCHLORIDE 2.5 MG/ML
INJECTION, SOLUTION EPIDURAL; INFILTRATION; INTRACAUDAL
Status: DISPENSED
Start: 2024-03-29

## (undated) RX ORDER — CEFAZOLIN SODIUM 2 G/100ML
INJECTION, SOLUTION INTRAVENOUS
Status: DISPENSED
Start: 2024-03-29